# Patient Record
Sex: FEMALE | Race: WHITE | Employment: UNEMPLOYED | URBAN - METROPOLITAN AREA
[De-identification: names, ages, dates, MRNs, and addresses within clinical notes are randomized per-mention and may not be internally consistent; named-entity substitution may affect disease eponyms.]

---

## 2020-12-12 ENCOUNTER — HOSPITAL ENCOUNTER (EMERGENCY)
Facility: CLINIC | Age: 60
Discharge: ANOTHER HOSPITAL | End: 2020-12-12
Attending: PHYSICIAN ASSISTANT | Admitting: PHYSICIAN ASSISTANT
Payer: MEDICARE

## 2020-12-12 ENCOUNTER — APPOINTMENT (OUTPATIENT)
Dept: CT IMAGING | Facility: CLINIC | Age: 60
End: 2020-12-12
Attending: PHYSICIAN ASSISTANT
Payer: MEDICARE

## 2020-12-12 ENCOUNTER — TELEPHONE (OUTPATIENT)
Facility: CLINIC | Age: 60
End: 2020-12-12

## 2020-12-12 ENCOUNTER — HOSPITAL ENCOUNTER (INPATIENT)
Facility: CLINIC | Age: 60
LOS: 2 days | Discharge: HOME | DRG: 178 | End: 2020-12-14
Attending: INTERNAL MEDICINE | Admitting: INTERNAL MEDICINE
Payer: MEDICARE

## 2020-12-12 VITALS
RESPIRATION RATE: 20 BRPM | HEART RATE: 57 BPM | WEIGHT: 198 LBS | SYSTOLIC BLOOD PRESSURE: 115 MMHG | DIASTOLIC BLOOD PRESSURE: 94 MMHG | OXYGEN SATURATION: 95 % | TEMPERATURE: 98.8 F

## 2020-12-12 DIAGNOSIS — J96.01 ACUTE RESPIRATORY FAILURE WITH HYPOXIA (H): ICD-10-CM

## 2020-12-12 DIAGNOSIS — J44.9 COPD (CHRONIC OBSTRUCTIVE PULMONARY DISEASE) (H): ICD-10-CM

## 2020-12-12 DIAGNOSIS — Z20.822 SUSPECTED COVID-19 VIRUS INFECTION: ICD-10-CM

## 2020-12-12 DIAGNOSIS — R50.9 FEBRILE ILLNESS: ICD-10-CM

## 2020-12-12 DIAGNOSIS — R05.9 COUGH: ICD-10-CM

## 2020-12-12 DIAGNOSIS — R06.02 SHORTNESS OF BREATH: ICD-10-CM

## 2020-12-12 DIAGNOSIS — U07.1 COVID-19: Primary | ICD-10-CM

## 2020-12-12 LAB
ALBUMIN SERPL-MCNC: 3.3 G/DL (ref 3.4–5)
ALP SERPL-CCNC: 121 U/L (ref 40–150)
ALT SERPL W P-5'-P-CCNC: 18 U/L (ref 0–50)
ANION GAP SERPL CALCULATED.3IONS-SCNC: 3 MMOL/L (ref 3–14)
AST SERPL W P-5'-P-CCNC: 19 U/L (ref 0–45)
BASE EXCESS BLDV CALC-SCNC: 3.9 MMOL/L
BASOPHILS # BLD AUTO: 0 10E9/L (ref 0–0.2)
BASOPHILS NFR BLD AUTO: 0.5 %
BILIRUB SERPL-MCNC: 0.3 MG/DL (ref 0.2–1.3)
BUN SERPL-MCNC: 12 MG/DL (ref 7–30)
CALCIUM SERPL-MCNC: 8.4 MG/DL (ref 8.5–10.1)
CHLORIDE SERPL-SCNC: 107 MMOL/L (ref 94–109)
CO2 SERPL-SCNC: 29 MMOL/L (ref 20–32)
CREAT SERPL-MCNC: 0.82 MG/DL (ref 0.52–1.04)
CRP SERPL-MCNC: 4.8 MG/L (ref 0–8)
D DIMER PPP FEU-MCNC: 0.8 UG/ML FEU (ref 0–0.5)
DIFFERENTIAL METHOD BLD: ABNORMAL
DIFFERENTIAL METHOD BLD: NORMAL
EOSINOPHIL # BLD AUTO: 0 10E9/L (ref 0–0.7)
EOSINOPHIL NFR BLD AUTO: 1 %
ERYTHROCYTE [DISTWIDTH] IN BLOOD BY AUTOMATED COUNT: 15.1 % (ref 10–15)
ERYTHROCYTE [DISTWIDTH] IN BLOOD BY AUTOMATED COUNT: NORMAL % (ref 10–15)
FERRITIN SERPL-MCNC: 26 NG/ML (ref 8–252)
GFR SERPL CREATININE-BSD FRML MDRD: 78 ML/MIN/{1.73_M2}
GLUCOSE BLDC GLUCOMTR-MCNC: 140 MG/DL (ref 70–99)
GLUCOSE SERPL-MCNC: 80 MG/DL (ref 70–99)
HCO3 BLDV-SCNC: 31 MMOL/L (ref 21–28)
HCT VFR BLD AUTO: 42.6 % (ref 35–47)
HCT VFR BLD AUTO: NORMAL % (ref 35–47)
HGB BLD-MCNC: 12.9 G/DL (ref 11.7–15.7)
HGB BLD-MCNC: NORMAL G/DL (ref 11.7–15.7)
IMM GRANULOCYTES # BLD: 0 10E9/L (ref 0–0.4)
IMM GRANULOCYTES NFR BLD: 0.5 %
LYMPHOCYTES # BLD AUTO: 1.8 10E9/L (ref 0.8–5.3)
LYMPHOCYTES NFR BLD AUTO: 45.3 %
MAGNESIUM SERPL-MCNC: 1.9 MG/DL (ref 1.6–2.3)
MCH RBC QN AUTO: 27.3 PG (ref 26.5–33)
MCH RBC QN AUTO: NORMAL PG (ref 26.5–33)
MCHC RBC AUTO-ENTMCNC: 30.3 G/DL (ref 31.5–36.5)
MCHC RBC AUTO-ENTMCNC: NORMAL G/DL (ref 31.5–36.5)
MCV RBC AUTO: 90 FL (ref 78–100)
MCV RBC AUTO: NORMAL FL (ref 78–100)
MONOCYTES # BLD AUTO: 0.4 10E9/L (ref 0–1.3)
MONOCYTES NFR BLD AUTO: 10.4 %
NEUTROPHILS # BLD AUTO: 1.7 10E9/L (ref 1.6–8.3)
NEUTROPHILS NFR BLD AUTO: 42.3 %
NRBC # BLD AUTO: 0 10*3/UL
NRBC BLD AUTO-RTO: 0 /100
NT-PROBNP SERPL-MCNC: 39 PG/ML (ref 0–900)
PCO2 BLDV: 55 MM HG (ref 40–50)
PH BLDV: 7.36 PH (ref 7.32–7.43)
PLATELET # BLD AUTO: 182 10E9/L (ref 150–450)
PLATELET # BLD AUTO: NORMAL 10E9/L (ref 150–450)
PO2 BLDV: 22 MM HG (ref 25–47)
POTASSIUM SERPL-SCNC: 5 MMOL/L (ref 3.4–5.3)
PROT SERPL-MCNC: 7.1 G/DL (ref 6.8–8.8)
RBC # BLD AUTO: 4.72 10E12/L (ref 3.8–5.2)
RBC # BLD AUTO: NORMAL 10E12/L (ref 3.8–5.2)
SARS-COV-2 RNA SPEC QL NAA+PROBE: NORMAL
SODIUM SERPL-SCNC: 139 MMOL/L (ref 133–144)
SPECIMEN SOURCE: NORMAL
TROPONIN I SERPL-MCNC: <0.015 UG/L (ref 0–0.04)
WBC # BLD AUTO: 4 10E9/L (ref 4–11)
WBC # BLD AUTO: NORMAL 10E9/L (ref 4–11)

## 2020-12-12 PROCEDURE — 250N000011 HC RX IP 250 OP 636: Performed by: PHYSICIAN ASSISTANT

## 2020-12-12 PROCEDURE — 85025 COMPLETE CBC W/AUTO DIFF WBC: CPT | Performed by: PHYSICIAN ASSISTANT

## 2020-12-12 PROCEDURE — 85379 FIBRIN DEGRADATION QUANT: CPT | Performed by: PHYSICIAN ASSISTANT

## 2020-12-12 PROCEDURE — 250N000009 HC RX 250: Performed by: PHYSICIAN ASSISTANT

## 2020-12-12 PROCEDURE — 99285 EMERGENCY DEPT VISIT HI MDM: CPT | Mod: 25 | Performed by: EMERGENCY MEDICINE

## 2020-12-12 PROCEDURE — 86140 C-REACTIVE PROTEIN: CPT | Performed by: PHYSICIAN ASSISTANT

## 2020-12-12 PROCEDURE — 93010 ELECTROCARDIOGRAM REPORT: CPT | Performed by: EMERGENCY MEDICINE

## 2020-12-12 PROCEDURE — 96375 TX/PRO/DX INJ NEW DRUG ADDON: CPT | Performed by: EMERGENCY MEDICINE

## 2020-12-12 PROCEDURE — 83735 ASSAY OF MAGNESIUM: CPT | Performed by: PHYSICIAN ASSISTANT

## 2020-12-12 PROCEDURE — 83880 ASSAY OF NATRIURETIC PEPTIDE: CPT | Performed by: PHYSICIAN ASSISTANT

## 2020-12-12 PROCEDURE — 99222 1ST HOSP IP/OBS MODERATE 55: CPT | Mod: AI | Performed by: INTERNAL MEDICINE

## 2020-12-12 PROCEDURE — 71275 CT ANGIOGRAPHY CHEST: CPT

## 2020-12-12 PROCEDURE — C9803 HOPD COVID-19 SPEC COLLECT: HCPCS | Performed by: EMERGENCY MEDICINE

## 2020-12-12 PROCEDURE — 94640 AIRWAY INHALATION TREATMENT: CPT | Performed by: EMERGENCY MEDICINE

## 2020-12-12 PROCEDURE — 96374 THER/PROPH/DIAG INJ IV PUSH: CPT | Mod: 59 | Performed by: EMERGENCY MEDICINE

## 2020-12-12 PROCEDURE — U0003 INFECTIOUS AGENT DETECTION BY NUCLEIC ACID (DNA OR RNA); SEVERE ACUTE RESPIRATORY SYNDROME CORONAVIRUS 2 (SARS-COV-2) (CORONAVIRUS DISEASE [COVID-19]), AMPLIFIED PROBE TECHNIQUE, MAKING USE OF HIGH THROUGHPUT TECHNOLOGIES AS DESCRIBED BY CMS-2020-01-R: HCPCS | Performed by: PHYSICIAN ASSISTANT

## 2020-12-12 PROCEDURE — 84484 ASSAY OF TROPONIN QUANT: CPT | Performed by: PHYSICIAN ASSISTANT

## 2020-12-12 PROCEDURE — 82728 ASSAY OF FERRITIN: CPT | Performed by: PHYSICIAN ASSISTANT

## 2020-12-12 PROCEDURE — 82803 BLOOD GASES ANY COMBINATION: CPT | Performed by: PHYSICIAN ASSISTANT

## 2020-12-12 PROCEDURE — 80053 COMPREHEN METABOLIC PANEL: CPT | Performed by: PHYSICIAN ASSISTANT

## 2020-12-12 PROCEDURE — 93005 ELECTROCARDIOGRAM TRACING: CPT | Performed by: EMERGENCY MEDICINE

## 2020-12-12 PROCEDURE — 250N000013 HC RX MED GY IP 250 OP 250 PS 637: Performed by: PHYSICIAN ASSISTANT

## 2020-12-12 PROCEDURE — 999N001017 HC STATISTIC GLUCOSE BY METER IP

## 2020-12-12 PROCEDURE — 120N000001 HC R&B MED SURG/OB

## 2020-12-12 RX ORDER — ACETAMINOPHEN 500 MG
1000 TABLET ORAL ONCE
Status: COMPLETED | OUTPATIENT
Start: 2020-12-12 | End: 2020-12-12

## 2020-12-12 RX ORDER — ONDANSETRON 2 MG/ML
4 INJECTION INTRAMUSCULAR; INTRAVENOUS EVERY 6 HOURS PRN
Status: DISCONTINUED | OUTPATIENT
Start: 2020-12-12 | End: 2020-12-14 | Stop reason: HOSPADM

## 2020-12-12 RX ORDER — ONDANSETRON 4 MG/1
4 TABLET, ORALLY DISINTEGRATING ORAL EVERY 6 HOURS PRN
Status: DISCONTINUED | OUTPATIENT
Start: 2020-12-12 | End: 2020-12-14 | Stop reason: HOSPADM

## 2020-12-12 RX ORDER — IOPAMIDOL 755 MG/ML
83 INJECTION, SOLUTION INTRAVASCULAR ONCE
Status: COMPLETED | OUTPATIENT
Start: 2020-12-12 | End: 2020-12-12

## 2020-12-12 RX ORDER — PROCHLORPERAZINE 25 MG
25 SUPPOSITORY, RECTAL RECTAL EVERY 12 HOURS PRN
Status: DISCONTINUED | OUTPATIENT
Start: 2020-12-12 | End: 2020-12-14 | Stop reason: HOSPADM

## 2020-12-12 RX ORDER — ALBUTEROL SULFATE 90 UG/1
6 AEROSOL, METERED RESPIRATORY (INHALATION) EVERY 6 HOURS PRN
Status: DISCONTINUED | OUTPATIENT
Start: 2020-12-12 | End: 2020-12-12 | Stop reason: HOSPADM

## 2020-12-12 RX ORDER — ACETAMINOPHEN 325 MG/1
650 TABLET ORAL EVERY 4 HOURS PRN
Status: DISCONTINUED | OUTPATIENT
Start: 2020-12-12 | End: 2020-12-14 | Stop reason: HOSPADM

## 2020-12-12 RX ORDER — DEXAMETHASONE SODIUM PHOSPHATE 4 MG/ML
6 INJECTION, SOLUTION INTRA-ARTICULAR; INTRALESIONAL; INTRAMUSCULAR; INTRAVENOUS; SOFT TISSUE ONCE
Status: COMPLETED | OUTPATIENT
Start: 2020-12-12 | End: 2020-12-12

## 2020-12-12 RX ORDER — ONDANSETRON 2 MG/ML
4 INJECTION INTRAMUSCULAR; INTRAVENOUS EVERY 30 MIN PRN
Status: DISCONTINUED | OUTPATIENT
Start: 2020-12-12 | End: 2020-12-12 | Stop reason: HOSPADM

## 2020-12-12 RX ORDER — SODIUM CHLORIDE 9 MG/ML
INJECTION, SOLUTION INTRAVENOUS CONTINUOUS
Status: DISCONTINUED | OUTPATIENT
Start: 2020-12-13 | End: 2020-12-13

## 2020-12-12 RX ORDER — KETOROLAC TROMETHAMINE 15 MG/ML
15 INJECTION, SOLUTION INTRAMUSCULAR; INTRAVENOUS ONCE
Status: COMPLETED | OUTPATIENT
Start: 2020-12-12 | End: 2020-12-12

## 2020-12-12 RX ORDER — PROCHLORPERAZINE MALEATE 5 MG
10 TABLET ORAL EVERY 6 HOURS PRN
Status: DISCONTINUED | OUTPATIENT
Start: 2020-12-12 | End: 2020-12-14 | Stop reason: HOSPADM

## 2020-12-12 RX ORDER — LIDOCAINE 40 MG/G
CREAM TOPICAL
Status: DISCONTINUED | OUTPATIENT
Start: 2020-12-12 | End: 2020-12-14 | Stop reason: HOSPADM

## 2020-12-12 RX ADMIN — IOPAMIDOL 83 ML: 755 INJECTION, SOLUTION INTRAVENOUS at 18:04

## 2020-12-12 RX ADMIN — KETOROLAC TROMETHAMINE 15 MG: 15 INJECTION, SOLUTION INTRAMUSCULAR; INTRAVENOUS at 21:16

## 2020-12-12 RX ADMIN — ONDANSETRON 4 MG: 2 INJECTION INTRAMUSCULAR; INTRAVENOUS at 21:18

## 2020-12-12 RX ADMIN — DEXAMETHASONE SODIUM PHOSPHATE 6 MG: 4 INJECTION, SOLUTION INTRAMUSCULAR; INTRAVENOUS at 20:58

## 2020-12-12 RX ADMIN — ACETAMINOPHEN 1000 MG: 500 TABLET, FILM COATED ORAL at 16:35

## 2020-12-12 RX ADMIN — ALBUTEROL SULFATE 6 PUFF: 90 AEROSOL, METERED RESPIRATORY (INHALATION) at 20:47

## 2020-12-12 RX ADMIN — SODIUM CHLORIDE 100 ML: 9 INJECTION, SOLUTION INTRAVENOUS at 18:04

## 2020-12-12 RX ADMIN — ALBUTEROL SULFATE 6 PUFF: 90 AEROSOL, METERED RESPIRATORY (INHALATION) at 19:24

## 2020-12-12 ASSESSMENT — ENCOUNTER SYMPTOMS
CARDIOVASCULAR NEGATIVE: 1
VOMITING: 1
FATIGUE: 1
CHEST TIGHTNESS: 1
COUGH: 1
ARTHRALGIAS: 1
DIARRHEA: 1
FEVER: 1
HEADACHES: 1
SHORTNESS OF BREATH: 1
ABDOMINAL PAIN: 0
WHEEZING: 1
NAUSEA: 1

## 2020-12-12 NOTE — ED PROVIDER NOTES
"  History     Chief Complaint   Patient presents with     Dental Pain     Shortness of Breath     which is her normal due to COPD     Fever     HPI  Radha Fowler is a 60 year old female with history of COPD (not on home oxygen), hypertension, type 2 diabetes mellitus, hypothyroidism, GERD, CAD s/p CABG x3, past methamphetamine abuse who presents with complaints of shortness of breath, fevers up to 101 F, body aches, productive cough, fatigue, and headache for the past 12 days.  Patient reports increased yellow sputum production from baseline.  She does have history of COPD and has been experiencing more wheezing and chest tightness.  She uses her inhaler with minimal improvement.  Patient states her boyfriend is ill with similar symptoms and she is pretty sure he has COVID-19 as he cannot taste or smell.  Patient had dental extractions completed on 12/5/2020.  She states she was feeling ill before her teeth were extracted.  Patient also complains of associated nausea, vomiting, and diarrhea.      Previous Records in Care Everywhere were Reviewed:  Allergies  Reconcile with Patient's Chart  Active Allergy Reactions Severity Noted Date Comments   Cats (Fur, Dander, Saliva) *Unknown   07/25/2018     Dog Dander *Unknown   07/25/2018       Medications  Reconcile with Patient's Chart  Medication Sig Dispensed Refills Start Date End Date Status   aspirin (ECOTRIN) 81 mg enteric coated tablet    Indications: HTN (hypertension) Take 1 tablet by mouth once daily with a meal.   0 02/13/2017   Active   venlafaxine (EFFEXOR XR) 150 mg Extended-Release capsule   Take 300 mg by mouth once daily with a meal.   0     Active   albuterol HFA (VENTOLIN HFA) 90 mcg/actuation inhaler    Indications: Centrilobular emphysema (HC) Inhale 2 Puffs by mouth 4 times daily if needed (\"rescue inhaler\"). 1 Inhaler   2 10/22/2019   Active   omeprazole (PRILOSEC) 40 mg Delayed-Release capsule    Indications: Gastroesophageal reflux disease, " esophagitis presence not specified TAKE 1 CAPSULE BY MOUTH ONCE DAILY BEFORE A MEAL 90 capsule   2 01/28/2020   Active   blood sugar diagnostic (ACCU-CHEK GUIDE) strip    Indications: Type 2 diabetes mellitus with other circulatory complication, without long-term current use of insulin (HC) Accu Chek guide strips E11.9 NIDDM type II - Test 1 time/day 100 Each   5 02/05/2020   Active   lancets    Indications: Type 2 diabetes mellitus with other circulatory complication, without long-term current use of insulin (HC) Accu Chek fast clix. E11.9 NIDDM type II - Test 1 time/day 100 Each   5 02/05/2020   Active   nitroglycerin (NITROSTAT) 0.4 mg sublingual tablet    Indications: S/P CABG x 3 Place 1 tablet under the tongue every 5 minutes if needed. 25 tablet   0 02/27/2020   Active   loratadine (CLARITIN) 10 mg tablet    Indications: Seasonal allergic rhinitis due to pollen Take 1 tablet by mouth once daily. For allergy symptoms 90 tablet   1 05/05/2020   Active   acetaminophen (TYLENOL EXTRA STRGTH) 500 mg tablet    Indications: S/P CABG x 3 Take 2 tablets by mouth every 6 hours if needed. Max acetaminophen dose: 4000mg in 24 hrs.   0 06/30/2020   Active   fluticasone (50 mcg per actuation) nasal solution (FLONASE)    Indications: Viral URI, Sinus pressure Inhale 1 Spray into both nostrils once daily if needed for Rhinitis. 1 Bottle   5 06/30/2020   Active   albuterol-ipratropium (DUONEB) (2.5-0.5 mg) in 3 mL NEBULIZATION solution    Indications: Chronic obstructive pulmonary disease with acute exacerbation (HC) Inhale 3 mL via a nebulizer every 6 hours if needed. 1 box   0 09/23/2020   Active   metoprolol succinate (TOPROL XL) 50 mg sustained-release tablet    Indications: S/P CABG x 3, Essential hypertension Take 1 tablet by mouth once daily. 90 tablet   1 09/23/2020   Active   levothyroxine (SYNTHROID) 100 mcg tablet    Indications: Hypothyroidism (acquired) TAKE 1 TABLET BY MOUTH ONCE DAILY BEFORE BREAKFAST FOR LOW  THYROID. BEST TAKEN ON AN EMPTY STOMACH. (RECHECK LABS IN 10 WEEKS) 90 tablet   1 09/23/2020   Active   lisinopriL (PRINIVIL; ZESTRIL) 2.5 mg tablet    Indications: Essential hypertension Take 1 tablet by mouth once daily. 90 tablet   3 09/23/2020   Active   metFORMIN (GLUCOPHAGE) 500 mg tablet    Indications: Type 2 diabetes mellitus with diabetic polyneuropathy, unspecified whether long term insulin use (HC) Take 1 tablet by mouth 2 times daily with meals. 180 tablet   1 09/23/2020   Active   gabapentin (NEURONTIN) 300 mg capsule    Indications: Neuralgia Take 4 capsules by mouth 2 times daily. 240 capsule   3 09/23/2020   Active   QUEtiapine (SEROQUEL) 25 mg tablet    Indications: Primary insomnia Take 1 tablet by mouth at bedtime if needed. 90 tablet   1 09/23/2020   Active   cyclobenzaprine (FLEXERIL) 10 mg tablet    Indications: Strain of neck muscle, initial encounter Take 1 tablet by mouth 2 times daily if needed for Muscle Spasm. 60 tablet   1 09/23/2020   Active   atorvastatin (LIPITOR) 40 mg tablet    Indications: S/P CABG x 3 TAKE 1 TABLET BY MOUTH AT BEDTIME 90 tablet   0 10/28/2020   Active   tiotropium (SPIRIVA) 18 mcg inhalation capsule    Indications: Chronic obstructive pulmonary disease, unspecified COPD type (HC) Inhale 18 mcg by mouth once daily. To decrease wheezing. 90 capsule   3 10/26/2020   Active   lidocaine 4% (SALONPAS, LIDOCAINE,) 4 % patch    Indications: Cervical radiculopathy Apply to intact skin for upto 12 hrs within 24-hr period. 30 Patch   5 10/26/2020   Active   Shower Chair    Indications: Balance problem, Mobility impaired For home use. 1 Device   0 10/26/2020   Active   nystatin powder (NYSTOP) powder    Indications: Candidal intertrigo APPLY 1 POWDER TOPICALLY TO AFFECTED AREA 4 TIMES DAILY 60 g   2 12/08/2020   Active   nystatin powder (MYCOSTATIN) powder    Indications: Candidal intertrigo Apply 1 Strip topically to affected area(s) 4 times daily. 1 Bottle   0 10/26/2020  12/06/2020 Discontinued   NYSTOP powder    Indications: Candidal intertrigo APPLY 1 POWDER TOPICALLY TO AFFECTED AREA 4 TIMES DAILY 30 g   0 12/06/2020 12/07/2020 Discontinued (Reorder (E-cancel not sent))     Active Problems  Reconcile with Patient's Chart  Problem Noted Date   Essential hypertension 10/04/2020   Fall 10/06/2018   Anemia associated with acute blood loss 08/04/2018   Hyperglycemia 08/04/2018   Coronary artery disease 08/02/2018   S/P CABG x 3 08/02/2018   Overview:     8/2/18 s/p Coronary bypass x3, left internal mammary artery to LAD, saphenous vein to intermediate marginal, saphenous vein to a distal right coronary artery.     Abnormal cardiovascular stress test--inferior ischemia 07/25/2018   Angina pectoris 07/25/2018   Chronic right shoulder pain 07/25/2018   Bell's palsy 09/28/2016   Type 2 diabetes mellitus with diabetic polyneuropathy 04/06/2016   Hypothyroidism (acquired) 04/06/2016   Excessive daytime sleepiness 07/20/2015   Vitamin D deficiency 06/12/2015   COPD (chronic obstructive pulmonary disease) 05/09/2013   Obesity, unspecified 04/08/2011   Hyperlipidemia LDL goal < 100 01/10/2011   Vitamin D deficiency 09/22/2010   Menopause 05/26/2010   Cigarette smoker 05/06/2010   HTN (hypertension) 02/18/2010   Depression with anxiety 12/10/2009   Esophageal reflux 10/06/2009   Methamphetamine abuse 06/02/2006   Delirium due to multiple etiologies, acute, hyperactive     Methamphetamine use disorder, severe, dependence       Resolved Problems    Problem Noted Date Resolved Date   Nicotine dependence with withdrawal 11/18/2016 08/05/2018   Neuralgia 06/20/2016 08/05/2018   Acquired hypothyroidism 10/21/2015 05/10/2016   Bipolar 2 disorder 10/06/2009 12/10/2009   Myalgia and myositis, unspecified 10/06/2009 05/10/2016   Unspecified hypothyroidism 10/06/2009 10/21/2015   Unspecified asthma(493.90) 10/06/2009 05/09/2013   Chest pain in adult   08/01/2018   Surgical History    Surgery Date  Site/Laterality Comments   BREAST LUMPECTOMY 2006 - 2006   [facility], cannot recall what side     COLONOSCOPY DIAGNOSTIC     Q5yr plan, due 2016     CORONARY ARTERY BYPASS GRAFT 08/01/2018   x3       Medical History    Medical History Date Comments   Unspecified hypothyroidism       Unspecified essential hypertension       Bipolar disorder, unspecified (HC)       Unspecified asthma(493.90)       Esophageal reflux       Allergic rhinitis, cause unspecified       Methamphetamine use (HC)   quit in    Inversion, nipple   and drainage   CAD (coronary artery disease)       COPD (chronic obstructive pulmonary disease) (HC)       Diabetes mellitus (HC)       Anxiety       Asthma       Tobacco abuse disorder         Family History    Medical History Relation Name Comments   Stroke Father       Cancer-breast Maternal Aunt   40's   Cancer-breast Maternal Aunt   40's   Hyperlipidemia Mother       Hypertension Mother       Other Mother   breast drainage, surgery not cancer   Rheum arthritis Mother       Thyroid Disease Mother       Valvular heart disease Mother         Relation Name Status Comments   Father        Maternal Aunt   Alive     Maternal Aunt   Alive     Mother   Alive       Social History    Tobacco Use Types Packs/Day Years Used Date   Current Every Day Smoker Cigarettes 0.25 30     Smokeless Tobacco: Never Used           Tobacco Cessation: Ready to Quit: No; Counseling Given: Yes  Comments: 6 cigarettes a day     Alcohol Use Drinks/Week oz/Week Comments   No 0 Standard drinks or equivalent   0.0            Allergies:  No Known Allergies    Problem List:    There are no active problems to display for this patient.       Past Medical History:    No past medical history on file.    Past Surgical History:    No past surgical history on file.    Family History:    No family history on file.    Social History:  Marital Status:  Single [1]  Social History     Tobacco Use     Smoking status:  Not on file   Substance Use Topics     Alcohol use: Not on file     Drug use: Not on file        Medications:    No current outpatient medications on file.        Review of Systems   Constitutional: Positive for fatigue and fever.   Respiratory: Positive for cough, chest tightness, shortness of breath and wheezing.    Cardiovascular: Negative.  Negative for chest pain.   Gastrointestinal: Positive for diarrhea, nausea and vomiting. Negative for abdominal pain.   Genitourinary: Negative.    Musculoskeletal: Positive for arthralgias.   Skin: Negative.    Neurological: Positive for headaches.   All other systems reviewed and are negative.      Physical Exam   BP: 124/54  Pulse: 65  Temp: 97.8  F (36.6  C)  Resp: 16  Weight: 89.8 kg (198 lb)  SpO2: 99 %      Physical Exam  Vitals signs and nursing note reviewed.   Constitutional:       General: She is in acute distress.      Appearance: She is well-developed. She is not ill-appearing, toxic-appearing or diaphoretic.   HENT:      Head: Normocephalic and atraumatic.      Nose: Nose normal. No congestion or rhinorrhea.      Mouth/Throat:      Lips: Pink.      Mouth: Mucous membranes are moist.      Pharynx: Oropharynx is clear. Uvula midline. No pharyngeal swelling, oropharyngeal exudate, posterior oropharyngeal erythema or uvula swelling.      Tonsils: No tonsillar exudate or tonsillar abscesses.      Comments: Several tooth extraction sites to left upper and lower jawline.  No purulent drainage.  No fluctuance or swelling.    Eyes:      Conjunctiva/sclera: Conjunctivae normal.      Pupils: Pupils are equal, round, and reactive to light.   Neck:      Musculoskeletal: Normal range of motion and neck supple. No neck rigidity.   Cardiovascular:      Rate and Rhythm: Normal rate and regular rhythm.      Heart sounds: Normal heart sounds.   Pulmonary:      Effort: Pulmonary effort is normal. Tachypnea present. No respiratory distress.      Breath sounds: No stridor, decreased  air movement or transmitted upper airway sounds. Wheezing (diffuse expiratory wheezing throughout lung fields) present. No decreased breath sounds, rhonchi or rales.   Abdominal:      General: There is no distension.      Palpations: Abdomen is soft.      Tenderness: There is no abdominal tenderness. There is no guarding or rebound.   Musculoskeletal: Normal range of motion.         General: No swelling or tenderness.      Right lower leg: No edema.      Left lower leg: No edema.   Lymphadenopathy:      Cervical: No cervical adenopathy.   Skin:     General: Skin is warm and dry.   Neurological:      General: No focal deficit present.      Mental Status: She is alert and oriented to person, place, and time.   Psychiatric:         Mood and Affect: Mood normal.         Behavior: Behavior normal.         ED Course        Procedures               EKG Interpretation:      Interpreted by Patrica Sánchez PA-C  Symptoms at time of EKG: Shortness of breath   Rhythm: Normal sinus   Rate: Bradycardia  Axis: Normal  Ectopy: None  Conduction: Normal  ST Segments/ T Waves: Non-specific ST-T wave changes and T wave inversion V1 and V2  Q Waves: None  Comparison to prior: No old EKG available    Clinical Impression: Non-specific EKG      Results for orders placed or performed during the hospital encounter of 12/12/20 (from the past 24 hour(s))   CBC with platelets, differential   Result Value Ref Range    WBC Canceled, Test credited 4.0 - 11.0 10e9/L    RBC Count Canceled, Test credited 3.8 - 5.2 10e12/L    Hemoglobin Canceled, Test credited 11.7 - 15.7 g/dL    Hematocrit Canceled, Test credited 35.0 - 47.0 %    MCV Canceled, Test credited 78 - 100 fl    MCH Canceled, Test credited 26.5 - 33.0 pg    MCHC Canceled, Test credited 31.5 - 36.5 g/dL    RDW Canceled, Test credited 10.0 - 15.0 %    Platelet Count Canceled, Test credited 150 - 450 10e9/L    Diff Method Canceled, Test credited    Comprehensive metabolic panel   Result Value  Ref Range    Sodium 139 133 - 144 mmol/L    Potassium 5.0 3.4 - 5.3 mmol/L    Chloride 107 94 - 109 mmol/L    Carbon Dioxide 29 20 - 32 mmol/L    Anion Gap 3 3 - 14 mmol/L    Glucose 80 70 - 99 mg/dL    Urea Nitrogen 12 7 - 30 mg/dL    Creatinine 0.82 0.52 - 1.04 mg/dL    GFR Estimate 78 >60 mL/min/[1.73_m2]    GFR Estimate If Black >90 >60 mL/min/[1.73_m2]    Calcium 8.4 (L) 8.5 - 10.1 mg/dL    Bilirubin Total 0.3 0.2 - 1.3 mg/dL    Albumin 3.3 (L) 3.4 - 5.0 g/dL    Protein Total 7.1 6.8 - 8.8 g/dL    Alkaline Phosphatase 121 40 - 150 U/L    ALT 18 0 - 50 U/L    AST 19 0 - 45 U/L   Troponin I   Result Value Ref Range    Troponin I ES <0.015 0.000 - 0.045 ug/L   NT pro BNP   Result Value Ref Range    N-Terminal Pro BNP Inpatient 39 0 - 900 pg/mL   D dimer quantitative   Result Value Ref Range    D Dimer 0.8 (H) 0.0 - 0.50 ug/ml FEU   Symptomatic COVID-19 Virus (Coronavirus) by PCR    Specimen: Nasopharyngeal   Result Value Ref Range    COVID-19 Virus PCR to [facility] - Source Nasopharyngeal     COVID-19 Virus PCR to [facility] - Result       Test received-See reflex to IDDL test SARS CoV2 (COVID-19) Virus RT-PCR   CBC with platelets differential   Result Value Ref Range    WBC 4.0 4.0 - 11.0 10e9/L    RBC Count 4.72 3.8 - 5.2 10e12/L    Hemoglobin 12.9 11.7 - 15.7 g/dL    Hematocrit 42.6 35.0 - 47.0 %    MCV 90 78 - 100 fl    MCH 27.3 26.5 - 33.0 pg    MCHC 30.3 (L) 31.5 - 36.5 g/dL    RDW 15.1 (H) 10.0 - 15.0 %    Platelet Count 182 150 - 450 10e9/L    Diff Method Automated Method     % Neutrophils 42.3 %    % Lymphocytes 45.3 %    % Monocytes 10.4 %    % Eosinophils 1.0 %    % Basophils 0.5 %    % Immature Granulocytes 0.5 %    Nucleated RBCs 0 0 /100    Absolute Neutrophil 1.7 1.6 - 8.3 10e9/L    Absolute Lymphocytes 1.8 0.8 - 5.3 10e9/L    Absolute Monocytes 0.4 0.0 - 1.3 10e9/L    Absolute Eosinophils 0.0 0.0 - 0.7 10e9/L    Absolute Basophils 0.0 0.0 - 0.2 10e9/L    Abs Immature Granulocytes 0.0 0 - 0.4 10e9/L     Absolute Nucleated RBC 0.0    Magnesium   Result Value Ref Range    Magnesium 1.9 1.6 - 2.3 mg/dL   CRP inflammation   Result Value Ref Range    CRP Inflammation 4.8 0.0 - 8.0 mg/L   Ferritin   Result Value Ref Range    Ferritin 26 8 - 252 ng/mL   CT Chest Pulmonary Embolism w Contrast    Narrative    EXAM: CT CHEST PULMONARY EMBOLISM W CONTRAST  LOCATION: [facility], [place]  DATE/TIME: 12/12/2020 5:27 PM    INDICATION: Shortness of breath with fevers and cough.  COMPARISON: None.  TECHNIQUE: CT chest pulmonary angiogram during arterial phase injection of IV contrast. Multiplanar reformats and MIP reconstructions were performed. Dose reduction techniques were used.   CONTRAST: 83 mL Isovue-370.    FINDINGS:  ANGIOGRAM CHEST: Pulmonary arteries are normal caliber and negative for pulmonary emboli. Thoracic aorta is negative for dissection. No CT evidence of right heart strain.    LUNGS AND PLEURA: A few faint minimal regions of ground-glass infiltrates within the periphery of the lower lobes and right middle lobe nonspecific.    MEDIASTINUM/AXILLAE: Sternotomy. There are a few borderline enlarged mediastinal nodes which are probably reactive. Small esophageal hiatal hernia.    UPPER ABDOMEN: Normal.    MUSCULOSKELETAL: Normal.      Impression    IMPRESSION:  1.  No pulmonary embolism.    2.  Previous sternotomy with postsurgical changes of CABG.    3.  A few very faint regions of minimal ground-glass infiltrates in the lower lobes nonspecific but compatible with inflammation.    4.  Borderline enlarged mediastinal lymph nodes likely reactive.   Blood gas venous   Result Value Ref Range    Ph Venous 7.36 7.32 - 7.43 pH    PCO2 Venous 55 (H) 40 - 50 mm Hg    PO2 Venous 22 (L) 25 - 47 mm Hg    Bicarbonate Venous 31 (H) 21 - 28 mmol/L    Base Excess Venous 3.9 mmol/L       Medications   albuterol (PROAIR HFA/PROVENTIL HFA/VENTOLIN HFA) 108 (90 Base) MCG/ACT inhaler 6 puff (6 puffs Inhalation Given 12/12/20 2047)    ondansetron (ZOFRAN) injection 4 mg (4 mg Intravenous Given 12/12/20 2118)   acetaminophen (TYLENOL) tablet 1,000 mg (1,000 mg Oral Given 12/12/20 1635)   iopamidol (ISOVUE-370) solution 83 mL (83 mLs Intravenous Given 12/12/20 1804)   sodium chloride 0.9 % bag 500mL for CT scan flush use (100 mLs Intravenous Given 12/12/20 1804)   dexamethasone (DECADRON) injection 6 mg (6 mg Intravenous Given 12/12/20 2058)   ketorolac (TORADOL) injection 15 mg (15 mg Intravenous Given 12/12/20 2116)       Assessments & Plan (with Medical Decision Making)   60 year old female with history of COPD (not on home oxygen), hypertension, type 2 diabetes mellitus, hypothyroidism, GERD, CAD s/p CABG x3, past methamphetamine abuse who presents with complaints of shortness of breath, fevers up to 101 F, body aches, productive cough, fatigue, and headache for the past 12 days.  Patient reports increased yellow sputum production from baseline.  She does have history of COPD and has been experiencing more wheezing and chest tightness.  Her boyfriend is ill with similar symptoms and she is pretty sure he has COVID-19 as he cannot taste or smell.  Patient had dental extractions completed on 12/5/2020.  She states she was feeling ill before her teeth were extracted.  Patient also complains of associated nausea, vomiting, and diarrhea.       Pt is afebrile on arrival.  Exam as above.  No ischemic changes on EKG.  Troponin is undetectable.  proBNP is not elevated.  White blood cell count is normal.  Comprehensive metabolic panel is unremarkable.  CT scan of the chest was negative for PE.  There are a few very faint regions of minimal groundglass infiltrates in the lower lungs which are nonspecific.  Borderline enlarged mediastinal lymph nodes also noted which are likely reactive.  COVID-19 testing was obtained and is currently pending.  Patient was given albuterol inhaler here with improvement in her wheezing.  Patient continues to complain of  feeling lightheaded and is noted to become hypoxic at 83% with any sort of movement such as sitting up in bed.  Patient was placed on 2 L of O2 with improvement and her oxygen saturations are in the mid 90s.  Patient was given dose of 6 mg IV Dexamethasone.  Discussed results with patient.  Given patient's hypoxia and concern for COVID-19 infection, will plan on admitting patient to the hospital for further evaluation and management.  Patient is in agreement with this plan.  We unfortunately do not have any inpatient bed availability here at [facility].  Attempted to contact multiple hospitals including [facility] and [facility] without any bed availability.  [facility] does have bed availability and therefore I discussed patient's case with on-call hospitalist, Marissa Baltazar PA-C, who accepts the transfer and care of the patient.  Considered COPD exacerbation contributing to patient's symptoms however did not start antibiotics until COVID-19 results are known especially given her findings on CT and possible exposure at home to COVID-19.  I did add on D-dimer and VBG as well as urinalysis per hospitalist request.  Patient was transferred in stable condition.    Discussed pt's case with the ED physician on staff (Dr. Cole).  He was involved in the care of the patient throughout her ED visit including assessment, diagnosis, treatment, and plan of the patient.    I have reviewed the nursing notes.    I have reviewed the findings, diagnosis, plan with the patient.     ED to Inpatient Handoff:    Discussed with Hospitalist, Marissa Baltazar PA-C at [facility]  Patient accepted for Inpatient Stay  Pending studies include VBG, D-dimer  Code Status: Full Code           New Prescriptions    No medications on file       Final diagnoses:   Acute respiratory failure with hypoxia (H)   Cough   Shortness of breath   Febrile illness   Suspected COVID-19 virus infection   COPD (chronic obstructive pulmonary disease) (H)        12/12/2020   [facility] EMERGENCY DEPT      Disclaimer:  This note consists of symbols derived from keyboarding, dictation and/or voice recognition software.  As a result, there may be errors in the script that have gone undetected.  Please consider this when interpreting information found in this chart.     Patrica Sánchez PA-C  12/12/20 3170    Physician Attestation   I, Abdirashid Cole MD, saw and evaluated the patient as part of a shared visit.  I have reviewed and discussed with the advanced practice provider their history, physical and plan.    I discussed/reviewed the patient's evaluation, medical decision making and treatment plan.   I personally reviewed the vital signs, medications, labs and imaging.    My key history or physical exam findings: 60 year old female with history of COPD, HTN, type 2 diabetes mellitus, CAD s/p CABG x3, past methamphetamine abuse shortness of breath, fevers up to 101 F, for the past 12 days. Exposed to Covid-19.  She has acute respiratory failure with hypoxia, labored breathing. Hypoxic at 83% with any sort of movement such as sitting up in bed and was placed on 2 L of O2 with improvement and her oxygen saturations are in the mid 90s.  Patient was given dose of 6 mg IV Dexamethasone    Key management decisions made by me: Dexamethasone, CT of chest PE protocol showed no PE, but ground glass opacities c/w Covid-19 pneumonia. Admit.    I agree with the PA's evaluation, assessment and treatment plan.  Face to face time with the patient = 10 minutes.    Abdirashid Cole MD  Date of Service (when I saw the patient): 12/14/20                   Abdirashid Cole MD  12/14/20 1007

## 2020-12-12 NOTE — ED TRIAGE NOTES
Pt had dental work on Dec 4th and has been sick following this she has fever, fatigue and nausea.

## 2020-12-13 LAB
ALBUMIN SERPL-MCNC: 3 G/DL (ref 3.4–5)
ALP SERPL-CCNC: 116 U/L (ref 40–150)
ALT SERPL W P-5'-P-CCNC: 16 U/L (ref 0–50)
ANION GAP SERPL CALCULATED.3IONS-SCNC: 2 MMOL/L (ref 3–14)
AST SERPL W P-5'-P-CCNC: 14 U/L (ref 0–45)
BILIRUB DIRECT SERPL-MCNC: <0.1 MG/DL (ref 0–0.2)
BILIRUB SERPL-MCNC: 0.3 MG/DL (ref 0.2–1.3)
BUN SERPL-MCNC: 17 MG/DL (ref 7–30)
CALCIUM SERPL-MCNC: 8.7 MG/DL (ref 8.5–10.1)
CHLORIDE SERPL-SCNC: 107 MMOL/L (ref 94–109)
CO2 SERPL-SCNC: 30 MMOL/L (ref 20–32)
CREAT SERPL-MCNC: 0.88 MG/DL (ref 0.52–1.04)
ERYTHROCYTE [DISTWIDTH] IN BLOOD BY AUTOMATED COUNT: 15.2 % (ref 10–15)
GFR SERPL CREATININE-BSD FRML MDRD: 71 ML/MIN/{1.73_M2}
GLUCOSE BLDC GLUCOMTR-MCNC: 114 MG/DL (ref 70–99)
GLUCOSE BLDC GLUCOMTR-MCNC: 144 MG/DL (ref 70–99)
GLUCOSE BLDC GLUCOMTR-MCNC: 155 MG/DL (ref 70–99)
GLUCOSE BLDC GLUCOMTR-MCNC: 161 MG/DL (ref 70–99)
GLUCOSE SERPL-MCNC: 120 MG/DL (ref 70–99)
HCT VFR BLD AUTO: 44.5 % (ref 35–47)
HGB BLD-MCNC: 13.2 G/DL (ref 11.7–15.7)
LABORATORY COMMENT REPORT: ABNORMAL
MCH RBC QN AUTO: 27.7 PG (ref 26.5–33)
MCHC RBC AUTO-ENTMCNC: 29.7 G/DL (ref 31.5–36.5)
MCV RBC AUTO: 94 FL (ref 78–100)
PLATELET # BLD AUTO: 194 10E9/L (ref 150–450)
POTASSIUM SERPL-SCNC: 4.7 MMOL/L (ref 3.4–5.3)
PROCALCITONIN SERPL-MCNC: <0.05 NG/ML
PROT SERPL-MCNC: 7 G/DL (ref 6.8–8.8)
RBC # BLD AUTO: 4.76 10E12/L (ref 3.8–5.2)
SARS-COV-2 RNA SPEC QL NAA+PROBE: POSITIVE
SODIUM SERPL-SCNC: 139 MMOL/L (ref 133–144)
SPECIMEN SOURCE: ABNORMAL
WBC # BLD AUTO: 2.4 10E9/L (ref 4–11)

## 2020-12-13 PROCEDURE — 258N000003 HC RX IP 258 OP 636: Performed by: INTERNAL MEDICINE

## 2020-12-13 PROCEDURE — 85027 COMPLETE CBC AUTOMATED: CPT | Performed by: INTERNAL MEDICINE

## 2020-12-13 PROCEDURE — 250N000012 HC RX MED GY IP 250 OP 636 PS 637: Performed by: INTERNAL MEDICINE

## 2020-12-13 PROCEDURE — 250N000013 HC RX MED GY IP 250 OP 250 PS 637: Performed by: INTERNAL MEDICINE

## 2020-12-13 PROCEDURE — 82565 ASSAY OF CREATININE: CPT | Performed by: INTERNAL MEDICINE

## 2020-12-13 PROCEDURE — 80048 BASIC METABOLIC PNL TOTAL CA: CPT | Performed by: INTERNAL MEDICINE

## 2020-12-13 PROCEDURE — 80076 HEPATIC FUNCTION PANEL: CPT | Performed by: INTERNAL MEDICINE

## 2020-12-13 PROCEDURE — 999N001017 HC STATISTIC GLUCOSE BY METER IP

## 2020-12-13 PROCEDURE — 36415 COLL VENOUS BLD VENIPUNCTURE: CPT | Performed by: INTERNAL MEDICINE

## 2020-12-13 PROCEDURE — 84145 PROCALCITONIN (PCT): CPT | Performed by: INTERNAL MEDICINE

## 2020-12-13 PROCEDURE — 120N000001 HC R&B MED SURG/OB

## 2020-12-13 PROCEDURE — 250N000011 HC RX IP 250 OP 636: Performed by: INTERNAL MEDICINE

## 2020-12-13 RX ORDER — LORATADINE 10 MG/1
10 TABLET ORAL DAILY PRN
COMMUNITY

## 2020-12-13 RX ORDER — VENLAFAXINE HYDROCHLORIDE 150 MG/1
300 CAPSULE, EXTENDED RELEASE ORAL DAILY
COMMUNITY

## 2020-12-13 RX ORDER — FLUTICASONE PROPIONATE 50 MCG
1 SPRAY, SUSPENSION (ML) NASAL DAILY PRN
COMMUNITY

## 2020-12-13 RX ORDER — QUETIAPINE FUMARATE 25 MG/1
25 TABLET, FILM COATED ORAL
COMMUNITY

## 2020-12-13 RX ORDER — NYSTATIN 100000 [USP'U]/G
POWDER TOPICAL 4 TIMES DAILY PRN
Status: ON HOLD | COMMUNITY
End: 2020-12-14

## 2020-12-13 RX ORDER — ATORVASTATIN CALCIUM 40 MG/1
40 TABLET, FILM COATED ORAL AT BEDTIME
Status: DISCONTINUED | OUTPATIENT
Start: 2020-12-13 | End: 2020-12-14 | Stop reason: HOSPADM

## 2020-12-13 RX ORDER — ASPIRIN 81 MG/1
81 TABLET ORAL DAILY
COMMUNITY

## 2020-12-13 RX ORDER — IPRATROPIUM BROMIDE AND ALBUTEROL SULFATE 2.5; .5 MG/3ML; MG/3ML
1 SOLUTION RESPIRATORY (INHALATION) EVERY 6 HOURS PRN
COMMUNITY

## 2020-12-13 RX ORDER — METOPROLOL SUCCINATE 50 MG/1
50 TABLET, EXTENDED RELEASE ORAL DAILY
COMMUNITY

## 2020-12-13 RX ORDER — LISINOPRIL 2.5 MG/1
2.5 TABLET ORAL DAILY
COMMUNITY

## 2020-12-13 RX ORDER — ATORVASTATIN CALCIUM 40 MG/1
40 TABLET, FILM COATED ORAL AT BEDTIME
COMMUNITY

## 2020-12-13 RX ORDER — METOPROLOL SUCCINATE 50 MG/1
50 TABLET, EXTENDED RELEASE ORAL DAILY
Status: DISCONTINUED | OUTPATIENT
Start: 2020-12-13 | End: 2020-12-14 | Stop reason: HOSPADM

## 2020-12-13 RX ORDER — NITROGLYCERIN 0.4 MG/1
0.4 TABLET SUBLINGUAL EVERY 5 MIN PRN
COMMUNITY

## 2020-12-13 RX ORDER — ALBUTEROL SULFATE 90 UG/1
2 AEROSOL, METERED RESPIRATORY (INHALATION) 4 TIMES DAILY PRN
COMMUNITY

## 2020-12-13 RX ORDER — OMEPRAZOLE 40 MG/1
40 CAPSULE, DELAYED RELEASE ORAL DAILY
COMMUNITY

## 2020-12-13 RX ORDER — LEVOTHYROXINE SODIUM 100 UG/1
100 TABLET ORAL
Status: DISCONTINUED | OUTPATIENT
Start: 2020-12-14 | End: 2020-12-14 | Stop reason: HOSPADM

## 2020-12-13 RX ORDER — GABAPENTIN 400 MG/1
1200 CAPSULE ORAL 2 TIMES DAILY
Status: DISCONTINUED | OUTPATIENT
Start: 2020-12-13 | End: 2020-12-14 | Stop reason: HOSPADM

## 2020-12-13 RX ORDER — LEVOTHYROXINE SODIUM 100 UG/1
100 TABLET ORAL
COMMUNITY

## 2020-12-13 RX ORDER — ACETAMINOPHEN 500 MG
1000 TABLET ORAL EVERY 6 HOURS PRN
COMMUNITY

## 2020-12-13 RX ORDER — GABAPENTIN 300 MG/1
1200 CAPSULE ORAL 2 TIMES DAILY
COMMUNITY

## 2020-12-13 RX ADMIN — ACETAMINOPHEN 650 MG: 325 TABLET, FILM COATED ORAL at 14:09

## 2020-12-13 RX ADMIN — ACETAMINOPHEN 650 MG: 325 TABLET, FILM COATED ORAL at 21:59

## 2020-12-13 RX ADMIN — GABAPENTIN 1200 MG: 400 CAPSULE ORAL at 11:28

## 2020-12-13 RX ADMIN — METOPROLOL SUCCINATE 50 MG: 50 TABLET, EXTENDED RELEASE ORAL at 11:28

## 2020-12-13 RX ADMIN — ATORVASTATIN CALCIUM 40 MG: 40 TABLET, FILM COATED ORAL at 21:56

## 2020-12-13 RX ADMIN — SODIUM CHLORIDE: 9 INJECTION, SOLUTION INTRAVENOUS at 00:30

## 2020-12-13 RX ADMIN — DEXAMETHASONE 6 MG: 2 TABLET ORAL at 07:57

## 2020-12-13 RX ADMIN — GABAPENTIN 1200 MG: 400 CAPSULE ORAL at 20:27

## 2020-12-13 RX ADMIN — ENOXAPARIN SODIUM 40 MG: 40 INJECTION SUBCUTANEOUS at 00:29

## 2020-12-13 RX ADMIN — OMEPRAZOLE 40 MG: 20 CAPSULE, DELAYED RELEASE ORAL at 11:28

## 2020-12-13 RX ADMIN — ENOXAPARIN SODIUM 40 MG: 40 INJECTION SUBCUTANEOUS at 21:56

## 2020-12-13 ASSESSMENT — ACTIVITIES OF DAILY LIVING (ADL)
ADLS_ACUITY_SCORE: 16
ADLS_ACUITY_SCORE: 19
ADLS_ACUITY_SCORE: 16
ADLS_ACUITY_SCORE: 16
ADLS_ACUITY_SCORE: 15
ADLS_ACUITY_SCORE: 17

## 2020-12-13 ASSESSMENT — MIFFLIN-ST. JEOR: SCORE: 1452.22

## 2020-12-13 NOTE — PLAN OF CARE
A&Ox4 but mildly confused to the details of her situation, VSS, O2 1L NC. LS diminished, infrequent congested cough. Occasional nausea, relieved by water and saltines, pt declined medication. BS active, CMS intact, voiding well. Generalized weakness d/t overall feeling of malaise, SBA w/ GB to bathroom. Airborne/special precautions initiated and maintained. COVID results still pending.

## 2020-12-13 NOTE — PHARMACY-ADMISSION MEDICATION HISTORY
Admission medication history interview status for this patient is complete. See [facility] admission navigator for allergy information, prior to admission medications and immunization status.     Medication history interview done via telephone during Covid-19 pandemic, indicate source(s): Patient  Medication history resources (including written lists, pill bottles, clinic record):Cleo  Pharmacy: [facility] [id number] [place], [place] - [address]     Changes made to PTA medication list:  Added: ALL meds  Deleted: -  Changed: -    Actions taken by pharmacist (provider contacted, etc):None   Additional medication history information:None  Medication reconciliation/reorder completed by provider prior to medication history?  no     Prior to Admission medications    Medication Sig Last Dose Taking? Auth Provider   albuterol (PROAIR HFA/PROVENTIL HFA/VENTOLIN HFA) 108 (90 Base) MCG/ACT inhaler Inhale 2 puffs into the lungs 4 times daily as needed for shortness of breath / dyspnea or wheezing Past Month at Unknown time Yes Unknown, Entered By History   aspirin 81 MG EC tablet Take 81 mg by mouth daily Past Week at Unknown time Yes Unknown, Entered By History   atorvastatin (LIPITOR) 40 MG tablet Take 40 mg by mouth At Bedtime Past Week at Unknown time Yes Unknown, Entered By History   gabapentin (NEURONTIN) 300 MG capsule Take 1,200 mg by mouth 2 times daily Past Week at Unknown time Yes Unknown, Entered By History   ipratropium - albuterol 0.5 mg/2.5 mg/3 mL (DUONEB) 0.5-2.5 (3) MG/3ML neb solution Take 1 vial by nebulization every 6 hours as needed for shortness of breath / dyspnea or wheezing Past Month at Unknown time Yes Unknown, Entered By History   levothyroxine (SYNTHROID/LEVOTHROID) 100 MCG tablet Take 100 mcg by mouth daily before breakfast Past Week at Unknown time Yes Unknown, Entered By History   lisinopril (ZESTRIL) 2.5 MG tablet Take 2.5 mg by mouth daily Past Week at Unknown time Yes Unknown, Entered  By History   metFORMIN (GLUCOPHAGE) 500 MG tablet Take 500 mg by mouth 2 times daily (with meals) Past Week at Unknown time Yes Unknown, Entered By History   metoprolol succinate ER (TOPROL-XL) 50 MG 24 hr tablet Take 50 mg by mouth daily Past Week at Unknown time Yes Unknown, Entered By History   nystatin (MYCOSTATIN) 611762 UNIT/GM external powder Apply topically 4 times daily as needed Apply to affected area Past Month at Unknown time Yes Unknown, Entered By History   omeprazole (PRILOSEC) 40 MG DR capsule Take 40 mg by mouth daily Past Week at Unknown time Yes Unknown, Entered By History   venlafaxine (EFFEXOR-XR) 150 MG 24 hr capsule Take 300 mg by mouth daily Past Week at Unknown time Yes Unknown, Entered By History   acetaminophen (TYLENOL) 500 MG tablet Take 1,000 mg by mouth every 6 hours as needed for mild pain More than a month at Unknown time  Unknown, Entered By History   fluticasone (FLONASE) 50 MCG/ACT nasal spray Spray 1 spray into both nostrils daily as needed for rhinitis or allergies More than a month at Unknown time  Unknown, Entered By History   loratadine (CLARITIN) 10 MG tablet Take 10 mg by mouth daily as needed for allergies More than a month at Unknown time  Unknown, Entered By History   nitroGLYcerin (NITROSTAT) 0.4 MG sublingual tablet Place 0.4 mg under the tongue every 5 minutes as needed for chest pain For chest pain place 1 tablet under the tongue every 5 minutes for 3 doses. If symptoms persist 5 minutes after 1st dose call 911. More than a month at Unknown time  Unknown, Entered By History   QUEtiapine (SEROQUEL) 25 MG tablet Take 25 mg by mouth nightly as needed (insomnia) More than a month at Unknown time  Unknown, Entered By History

## 2020-12-13 NOTE — H&P
Admitted:     12/12/2020      PRIMARY CARE PROVIDER:  Sarahi Castaneda CNP at [facility] in [place], [place]      CHIEF COMPLAINT:  Shortness of breath, fever.      HISTORY OF PRESENT ILLNESS:  Radha Fowler is a 60-year-old with known COPD, not requiring any home oxygen, hypertension, diabetes, hypothyroidism, GERD, heart disease, including bypass surgery, and prior history of methamphetamine use, presents initially at [facility] Emergency Department with fever, shortness of breath and some dental pain.      The patient has had positive sick contacts, including her boyfriend, who may have had COVID.  She is complaining of shortness of breath and fevers up to 101 with associated myalgias, productive cough, headache for almost 2 weeks.  She complains of yellow sputum production.  She has been feeling more wheezing and having more chest tightness.  She most recently underwent dental extraction on 12/05 and has some post-extraction discomfort.  The day after her dental extraction, the patient went in to see her provider and thought that she had an infection, so she has been started on antibiotics and is currently 4 of 7 days into her treatment.  Due to her worsening shortness of breath, the patient came into [facility] for further assessment.      In the hospital, the patient had an ECG, which showed sinus bradycardia with nonspecific ST segment T-wave changes.   Kidney function was normal.  LFTs were also normal with the exception of albumin that is slightly low at 3.3.  Troponin is negative.  ProBNP is 39.  D-dimer is 0.8.  COVID-19 test was sent out.  White count was 4, hemoglobin 12.9, and platelets 182.  Magnesium 1.9.  CRP was 4.8.  Ferritin was 26.  The patient had a CT of the chest which showed no evidence of pulmonary embolism, but prior sternotomy with postsurgical changes from CAB.  She has a few faint regions of minimal ground-glass infiltrates in the lower lobes that are nonspecific but  compatible with inflammation.  There are borderline enlarged mediastinal lymph nodes, likely reactive.  Venous blood gas showed pH of 7.36, pCO2 of 55, pO2 of 22, bicarbonate of 31.  The patient received albuterol nebs with some improvement.  The patient continues to be hypoxic, needing oxygen.  She was given a dose of dexamethasone and given her hypoxia and possible COVID, she was planning on being admitted at [facility], but due to lack of beds, was sent to [facility].      The patient presented at [facility].  She was afebrile, slightly hypertensive, stable oxygenation and mentation.  The patient is currently undergoing COVID rule out precautions and is considered high risk and would certainly recommend a 72-hour repeat if her first one is negative.      PAST MEDICAL HISTORY:   1.  Hypertension.   2.  History of falls.   3.  History of anemia.   4.  Hyperglycemia.   5.  Coronary artery disease including 3-vessel coronary bypass surgery on 08/02/2018 (LIMA to LAD, SVG to intermediate marginal and SVG to distal right coronary).   6.  Chronic right shoulder pain.   7.  History of Bell's palsy.   8.  Type 2 diabetes with diabetic polyneuropathy.   9.  Hypothyroidism.   10.  Excessive daytime sleepiness.   11.  Vitamin D deficiency.   12.  COPD.   13.  Obesity.   14.  Hyperlipidemia.   15.  Menopause.   16.  Nicotine dependence.   17.  Depression/anxiety.   19.   GERD.   20.  History of methamphetamine use.      PAST SURGICAL HISTORY:   1.  Breast lumpectomy.   2.  Diagnostic colonoscopy.   3.  Coronary bypass surgery.      FAMILY HISTORY:  Father with stroke.  Maternal aunt with breast cancer.  Mother with hypertension, hyperlipidemia, rheumatoid arthritis, thyroid disease and valvular disease.      SOCIAL HISTORY:  The patient continues to smoke 1/4 pack for the last 30 years.  Denies any alcohol use.      ALLERGIES:  NO KNOWN DRUG ALLERGIES.      CURRENT MEDICATION LIST:     1.  Aspirin.    2.  Effexor.   3.  Albuterol.   4.  Prilosec.   6.  Sublingual nitroglycerin.   7.  Claritin.   8.  Tylenol.   9.  Flonase.   10.  DuoNebs.   11.  Toprol-XL.   12.  Levothyroxine.   13.  Lisinopril.   14.  Metformin.   15.  Neurontin.   16.  Seroquel.   17.  Flexeril.   18.  Lipitor.   19.  Spiriva.      REVIEW OF SYSTEMS:  Ten points reviewed and as dictated in history of present illness.      PHYSICAL EXAMINATION:   VITAL SIGNS:  Temperature 96.1, heart rate 71, respirations 20, blood pressure 155/74, sats 96% on room air.   GENERAL:  The patient is resting comfortably, no distress, oriented x 3.   HEENT:  Normocephalic, atraumatic.  Mucous membranes are moist.  Noted some tooth extractions.  Pupils are equal.   NECK:  Veins are not elevated.   PULMONARY:  Lungs are diminished at the bases.  Some wheezing noted on the upper lung fields.   GASTROINTESTINAL:  Abdomen is soft, obese, nondistended.  There is no guarding or rebound.   MUSCULOSKELETAL:  No edema.   NEUROLOGIC:  She is able to move all 4 extremities.  Cranial nerves are grossly intact.   SKIN:  Warm, dry, well perfused.   PSYCHIATRIC:  Mood and affect stable.      LABORATORY AND IMAGING STUDIES:  As dictated in history of present illness.  ECG shows sinus bradycardia with nonspecific ST segment T-wave changes.      ASSESSMENT:  Radha Fowler is 60, who has been sick for almost 2 weeks with cough, sputum production, wheezing with known COPD, not on oxygen, currently on antibiotics, recent dental extraction, being admitted for rule out COVID versus chronic obstructive pulmonary disease exacerbation.      PLAN:   1.  Rule out COVID.  The patient will be on COVID precautions.  COVID PCR has been obtained.  The patient is at moderate to high risk, so if her first COVID is negative, she should be retested in 48-72 hours.  We will continue the patient on her Decadron.  CT imaging showed no PE, but a few faint regions of minimal ground-glass infiltrates in  the lower lobes compatible with inflammation.  If the patient is positive for COVID, then we will initiate full COVID labs and interventions, including remdesivir and anticoagulation.  We will check a procalcitonin level.  Of note, the patient already has had 4 days of antibiotics from her primary care physician as she did have COPD.  She may have been partially treated for that or bacterial pneumonia.   2.  Atherosclerotic cardiovascular disease.  EKG shows no acute ischemic changes.  We will await her medication reconciliation, but we will continue the patient on her aspirin, sublingual nitroglycerin, Toprol-XL, ACE inhibitor, and Lipitor.   3.  History of chronic obstructive pulmonary disease.  The patient does have some mild wheezing.  We will continue the patient on all her home COPD medications, but given that she is under PUI for COVID, we are only going to use albuterol metered-dose inhaler, Spiriva.   4  Hypothyroidism.  We will continue the patient on her Synthroid.   5.  Hypertension.  We will continue the patient on metoprolol and lisinopril.   6.  History of diabetes.  We are going to hold the patient's metformin, given her contrast.   7.  History of peripheral neuropathy.  The patient will be continued on her Neurontin.   8.  Hyperlipidemia.  We will continue the patient on her Lipitor.   9.  Reflux disease.  We will continue the patient on a PPI.   10.  Deep venous thrombosis prophylaxis.  The patient will receive compression boots, pending  COVID testing.      CODE STATUS:  FULL.         LANNY MIMS MD             D: 2020   T: 2020   MT: JACKI      Name:     YANY GARBER   MRN:      [id number]        Account:      [id number]   :      1960        Admitted:     2020                   Document: [id number]       cc: Sarahi Castaneda CNP

## 2020-12-13 NOTE — PROVIDER NOTIFICATION
wbp Dr. Bond pt is covid positive. ty    - took call from [facility] and pt did come back covid positive. Provider and bedside RN and charge nurse notified.  1041 12/13/2020

## 2020-12-13 NOTE — PLAN OF CARE
[id number]: Pt resting comfortably. A&O. VSS on RA. Tylenol given for pain. Transfers SBA.  and 144. Transfers SBA. AUO. Family updated. Will continue to monitor.

## 2020-12-13 NOTE — PLAN OF CARE
Pt arrived to [facility] ortho/spine unit at 2325. Pt settled and oriented to room. Admission profile completed. Discussed who should be POC for pt's family, and pt has identified Elvin Robert, roommate, and Steven Fowler, daughter, as family members who will receive information from nursing staff.

## 2020-12-13 NOTE — PROGRESS NOTES
Agree with the note per Dr Baird. Noted to be COVID positive. Not hypoxic. Will stop decadron and no remdesivir indicated.

## 2020-12-13 NOTE — TELEPHONE ENCOUNTER
3-Hospitalist Huddle Documentation    Acuity/Preferred Bed Type: medical bed   Infection Concerns: none and COVID  Additional Specialist Needed Or Specialist Already Contacted:   None  Timely Treatments Needed: No  Important things to know/address during hospitalization: sitter not needed      59 yo with hx of COPD (not on O2), CAD, HTN, DM not on insulin, on going tobacco abuse who comes in for increasing SOB, cough, fever 101 since 12/1. Spouse likely has COVID (loss of taste and smell, got tested today).   Dental work on 12/5 for extraction but sites ok. Does not look infected. Was already feeling bad prior to dental work.   O2 83% with activity. Now on 2L with Sats 96%   CT negative for PE, faint ground-glass at the lower lobe.   Albuterol inhaler helped.    Dexamethasone 6mg given.    Admit to med bed for acute hypoxic resp failure likely due to COVID 19   Symptomatic test pending  Possibly concomitant COVID exacerbation.   No abx at time given normal WBC. LA.

## 2020-12-14 VITALS
BODY MASS INDEX: 35.72 KG/M2 | WEIGHT: 201.6 LBS | OXYGEN SATURATION: 94 % | HEIGHT: 63 IN | HEART RATE: 63 BPM | SYSTOLIC BLOOD PRESSURE: 169 MMHG | RESPIRATION RATE: 16 BRPM | DIASTOLIC BLOOD PRESSURE: 67 MMHG | TEMPERATURE: 98.9 F

## 2020-12-14 LAB
GLUCOSE BLDC GLUCOMTR-MCNC: 109 MG/DL (ref 70–99)
GLUCOSE BLDC GLUCOMTR-MCNC: 129 MG/DL (ref 70–99)
GLUCOSE BLDC GLUCOMTR-MCNC: 154 MG/DL (ref 70–99)

## 2020-12-14 PROCEDURE — 250N000011 HC RX IP 250 OP 636: Performed by: INTERNAL MEDICINE

## 2020-12-14 PROCEDURE — 250N000013 HC RX MED GY IP 250 OP 250 PS 637: Performed by: INTERNAL MEDICINE

## 2020-12-14 PROCEDURE — 94640 AIRWAY INHALATION TREATMENT: CPT

## 2020-12-14 PROCEDURE — 999N001017 HC STATISTIC GLUCOSE BY METER IP

## 2020-12-14 PROCEDURE — 250N000012 HC RX MED GY IP 250 OP 636 PS 637: Performed by: INTERNAL MEDICINE

## 2020-12-14 PROCEDURE — 999N000157 HC STATISTIC RCP TIME EA 10 MIN

## 2020-12-14 PROCEDURE — 99239 HOSP IP/OBS DSCHRG MGMT >30: CPT | Performed by: INTERNAL MEDICINE

## 2020-12-14 RX ORDER — PREDNISONE 20 MG/1
40 TABLET ORAL DAILY
Qty: 4 TABLET | Refills: 0 | Status: SHIPPED | OUTPATIENT
Start: 2020-12-14 | End: 2020-12-16

## 2020-12-14 RX ORDER — LORAZEPAM 2 MG/ML
0.5 INJECTION INTRAMUSCULAR ONCE
Status: COMPLETED | OUTPATIENT
Start: 2020-12-14 | End: 2020-12-14

## 2020-12-14 RX ORDER — ASPIRIN 81 MG/1
81 TABLET ORAL DAILY
Status: DISCONTINUED | OUTPATIENT
Start: 2020-12-14 | End: 2020-12-14 | Stop reason: HOSPADM

## 2020-12-14 RX ORDER — PREDNISONE 20 MG/1
40 TABLET ORAL DAILY
Status: DISCONTINUED | OUTPATIENT
Start: 2020-12-14 | End: 2020-12-14 | Stop reason: HOSPADM

## 2020-12-14 RX ORDER — QUETIAPINE FUMARATE 25 MG/1
25 TABLET, FILM COATED ORAL
Status: DISCONTINUED | OUTPATIENT
Start: 2020-12-14 | End: 2020-12-14 | Stop reason: HOSPADM

## 2020-12-14 RX ORDER — VENLAFAXINE HYDROCHLORIDE 150 MG/1
300 CAPSULE, EXTENDED RELEASE ORAL DAILY
Status: DISCONTINUED | OUTPATIENT
Start: 2020-12-14 | End: 2020-12-14 | Stop reason: HOSPADM

## 2020-12-14 RX ORDER — LORAZEPAM 0.5 MG/1
0.5 TABLET ORAL EVERY 4 HOURS PRN
Status: DISCONTINUED | OUTPATIENT
Start: 2020-12-14 | End: 2020-12-14 | Stop reason: HOSPADM

## 2020-12-14 RX ORDER — LISINOPRIL 2.5 MG/1
2.5 TABLET ORAL DAILY
Status: DISCONTINUED | OUTPATIENT
Start: 2020-12-14 | End: 2020-12-14 | Stop reason: HOSPADM

## 2020-12-14 RX ADMIN — UMECLIDINIUM BROMIDE AND VILANTEROL TRIFENATATE 1 PUFF: 62.5; 25 POWDER RESPIRATORY (INHALATION) at 07:06

## 2020-12-14 RX ADMIN — OMEPRAZOLE 40 MG: 20 CAPSULE, DELAYED RELEASE ORAL at 08:09

## 2020-12-14 RX ADMIN — LORAZEPAM 0.5 MG: 0.5 TABLET ORAL at 06:41

## 2020-12-14 RX ADMIN — PREDNISONE 40 MG: 20 TABLET ORAL at 09:58

## 2020-12-14 RX ADMIN — GABAPENTIN 1200 MG: 400 CAPSULE ORAL at 08:09

## 2020-12-14 RX ADMIN — LORAZEPAM 0.5 MG: 2 INJECTION INTRAMUSCULAR; INTRAVENOUS at 03:05

## 2020-12-14 RX ADMIN — VENLAFAXINE HYDROCHLORIDE 300 MG: 150 CAPSULE, EXTENDED RELEASE ORAL at 09:57

## 2020-12-14 RX ADMIN — METOPROLOL SUCCINATE 50 MG: 50 TABLET, EXTENDED RELEASE ORAL at 08:09

## 2020-12-14 RX ADMIN — LEVOTHYROXINE SODIUM 100 MCG: 0.1 TABLET ORAL at 06:41

## 2020-12-14 RX ADMIN — ASPIRIN 81 MG: 81 TABLET, COATED ORAL at 09:58

## 2020-12-14 RX ADMIN — LISINOPRIL 2.5 MG: 2.5 TABLET ORAL at 09:58

## 2020-12-14 ASSESSMENT — ACTIVITIES OF DAILY LIVING (ADL)
ADLS_ACUITY_SCORE: 16
ADLS_ACUITY_SCORE: 17

## 2020-12-14 ASSESSMENT — MIFFLIN-ST. JEOR: SCORE: 1453.58

## 2020-12-14 NOTE — PLAN OF CARE
A/O.  O2 sats stable on room air. Started on Prednisone today-will continue for 2 days at home.  Prescription for Prednisone given to pt.  Discharge instructions given to pt. Verbalizes understanding. Personal belongings gathered.  Awaiting ride from significant other, Elvin.  Spoke with Elvin on the phone earlier about watching pt for any worsening symptoms once at home.  Also instructed that pt would be sent home on Prednisone but had no oxygen needs.  Verbalized understanding.

## 2020-12-14 NOTE — PLAN OF CARE
Overnight, pt appeared to enter into withdrawal from methamphetamines. Severe agitation and anxiety, confusion, dizziness, headache, and hypertension. Last meth use on Friday 12/11. IV ativan given x1, PO ativan x1 for continued symptoms. Pt stated that her family and significant other are unaware of her current regular use. Pt has requested that nursing staff not share these details with her family. She has gone through treatment previously, but unsuccessful.     2L O2, up Ax2 w/ GB d/t intense unsteadiness. LS diminished, BS active, CMS intact. Voiding well, tolerating regular diet but decreased appetite. One episode of coughing which quickly resolved. Will continue monitor.

## 2020-12-14 NOTE — PROVIDER NOTIFICATION
"0247 MD paged \"Pt actively withdrawing from meth. Denied use up until now. Last use was Friday prior to admission. Agitation, elevated BP, confusion, dizziness. Would you like to do anything or just continue to monitor?\"    0258: spoke with MD, ativan IV administered.    0332: Pt appears slightly sedated with ativan, wakes to touch but calm and oriented when awake. Denies headache.  "

## 2020-12-14 NOTE — DISCHARGE SUMMARY
"[facility]  Hospitalist Discharge Summary       Date of Admission:  12/12/2020  Date of Discharge:  12/14/2020  Discharging Providera: Abel Ferguson MD      Discharge Diagnoses   COVID-19 infection  COPD exacerbation    Follow-ups Needed After Discharge   Follow-up Appointments     Follow-up and recommended labs and tests       Follow up with primary care provider, [facility],   within 7 days for hospital follow up.               Discharge Disposition   Discharged to home  Condition at discharge: Stable    History of Present Illness   Per admission H&P:  \"Radha Fowler is a 60-year-old with known COPD, not requiring any home oxygen, hypertension, diabetes, hypothyroidism, GERD, heart disease, including bypass surgery, and prior history of methamphetamine use, presents initially at [facility] Emergency Department with fever, shortness of breath and some dental pain.      The patient has had positive sick contacts, including her boyfriend, who may have had COVID.  She is complaining of shortness of breath and fevers up to 101 with associated myalgias, productive cough, headache for almost 2 weeks.  She complains of yellow sputum production.  She has been feeling more wheezing and having more chest tightness.  She most recently underwent dental extraction on 12/05 and has some post-extraction discomfort.  The day after her dental extraction, the patient went in to see her provider and thought that she had an infection, so she has been started on antibiotics and is currently 4 of 7 days into her treatment.  Due to her worsening shortness of breath, the patient came into [facility] for further assessment.\"    Hospital Course     COVID-19 infection  COPD exacerbation  As outlined above, patient presented to the hospital complaining of shortness of breath and fevers.  Noted to have a Covid positive contact of her boyfriend.  She had presented to a local " hospital.  CT PE protocol was done and negative for PE with some very faint minimal groundglass opacities in the bilateral lower lobes.  She was felt to be hypoxic at the outside hospital with an O2 saturation of 83%.  Therefore was given 6 mg IV dexamethasone and transferred here for admission.  COVID-19 testing returned positive.  Here, she was able to be weaned to room air.  She will be discharged on prednisone for couple more days not because of Covid, but because of COPD exacerbation.  She said she had sufficient inhalers in the like at home.  Given the mild severity of her Covid infection, she does not need to be discharged on any prophylactic anticoagulation.    Consultations This Hospital Stay   None      Code Status   Full Code    Time Spent on this Encounter   I, Abel Ferguson MD, personally saw the patient today and spent greater than 30 minutes discharging this patient.       Abel Ferguson MD  [facility]  ______________________________________________________________________    Physical Exam   Vital Signs: Temp: 97.5  F (36.4  C) Temp src: Temporal BP: 121/57 Pulse: 61   Resp: 16 SpO2: 94 % O2 Device: None (Room air)(while asleep) Oxygen Delivery: 2 LPM(titrated to 1L)  Weight: 201 lbs 9.6 oz      General: No acute distress, looks comfortable.    HEENT: No scleral icterus. Oropharynx moist.     Neck: Supple. Normal range of motion.    Pulmonary: Normal work of breathing. Clear to auscultation bilaterally.    Cardiovascular: Regular rate and rhythm without murmur or extra heart sounds.    Abdomen: Soft and non-tender.    Extremities: No peripheral edema. No clubbing.    Neurologic: Awake, alert, appropriate.    Skin: Warm and dry.    Psychiatric: Normal affect and mood.       Primary Care Physician   [facility]    Discharge Orders      COVID-19 GetWell Loop Referral      Reason for your hospital stay    Shortness of breath due to COVID 19  infection     Follow-up and recommended labs and tests     Follow up with primary care provider, [facility], within 7 days for hospital follow up.     Contact provider    Contact your primary care provider if If increased trouble breathing, new arm/leg swelling, dizziness/passing out, falls, bleeding that doesn't stop, or uncontrolled pain.     Discharge - Quarantine/Isolation Instruction    Date of symptom onset:     Date of first positive test:    If you tested positive COVID-19 and show symptoms (fever, cough, body aches or trouble breathing):        Stay home and away from others (self-isolate) until:        At least 10 days have passed since your symptoms started. AND...        You've had no fever-and no medicine that reduces fever-for 3 full days (72 hours). AND...         Your other symptoms have resolved (gotten better).  If you tested positive for COVID-19 but don't show symptoms:       Stay home and away from others (self-isolate) until at least 10 days have passed since the date of your first positive COVID-19 test.     Activity    Your activity upon discharge: activity as tolerated     Diet    Follow this diet upon discharge: Regular       Significant Results and Procedures   Most Recent 3 CBC's:  Recent Labs   Lab Test 12/13/20  0650 12/12/20  1645 12/12/20  1621   WBC 2.4* 4.0 Canceled, Test credited   HGB 13.2 12.9 Canceled, Test credited   MCV 94 90 Canceled, Test credited    182 Canceled, Test credited     Most Recent 3 BMP's:  Recent Labs   Lab Test 12/13/20  0650 12/12/20  1621    139   POTASSIUM 4.7 5.0   CHLORIDE 107 107   CO2 30 29   BUN 17 12   CR 0.88 0.82   ANIONGAP 2* 3   ANDRÉS 8.7 8.4*   * 80     Most Recent 2 LFT's:  Recent Labs   Lab Test 12/13/20  0650 12/12/20  1621   AST 14 19   ALT 16 18   ALKPHOS 116 121   BILITOTAL 0.3 0.3     Most Recent 3 INR's:No lab results found.,   Results for orders placed or performed during the hospital encounter  of 12/12/20   CT Chest Pulmonary Embolism w Contrast    Narrative    EXAM: CT CHEST PULMONARY EMBOLISM W CONTRAST  LOCATION: [facility]  DATE/TIME: 12/12/2020 5:27 PM    INDICATION: Shortness of breath with fevers and cough.  COMPARISON: None.  TECHNIQUE: CT chest pulmonary angiogram during arterial phase injection of IV contrast. Multiplanar reformats and MIP reconstructions were performed. Dose reduction techniques were used.   CONTRAST: 83 mL Isovue-370.    FINDINGS:  ANGIOGRAM CHEST: Pulmonary arteries are normal caliber and negative for pulmonary emboli. Thoracic aorta is negative for dissection. No CT evidence of right heart strain.    LUNGS AND PLEURA: A few faint minimal regions of ground-glass infiltrates within the periphery of the lower lobes and right middle lobe nonspecific.    MEDIASTINUM/AXILLAE: Sternotomy. There are a few borderline enlarged mediastinal nodes which are probably reactive. Small esophageal hiatal hernia.    UPPER ABDOMEN: Normal.    MUSCULOSKELETAL: Normal.      Impression    IMPRESSION:  1.  No pulmonary embolism.    2.  Previous sternotomy with postsurgical changes of CABG.    3.  A few very faint regions of minimal ground-glass infiltrates in the lower lobes nonspecific but compatible with inflammation.    4.  Borderline enlarged mediastinal lymph nodes likely reactive.       Discharge Medications   Current Discharge Medication List      START taking these medications    Details   predniSONE (DELTASONE) 20 MG tablet Take 2 tablets (40 mg) by mouth daily for 2 days  Qty: 4 tablet, Refills: 0    Associated Diagnoses: COVID-19         CONTINUE these medications which have NOT CHANGED    Details   albuterol (PROAIR HFA/PROVENTIL HFA/VENTOLIN HFA) 108 (90 Base) MCG/ACT inhaler Inhale 2 puffs into the lungs 4 times daily as needed for shortness of breath / dyspnea or wheezing    Comments: Pharmacy may dispense brand covered by insurance (Proair, or proventil or ventolin or  generic albuterol inhaler)      aspirin 81 MG EC tablet Take 81 mg by mouth daily      atorvastatin (LIPITOR) 40 MG tablet Take 40 mg by mouth At Bedtime      gabapentin (NEURONTIN) 300 MG capsule Take 1,200 mg by mouth 2 times daily      ipratropium - albuterol 0.5 mg/2.5 mg/3 mL (DUONEB) 0.5-2.5 (3) MG/3ML neb solution Take 1 vial by nebulization every 6 hours as needed for shortness of breath / dyspnea or wheezing      levothyroxine (SYNTHROID/LEVOTHROID) 100 MCG tablet Take 100 mcg by mouth daily before breakfast      lisinopril (ZESTRIL) 2.5 MG tablet Take 2.5 mg by mouth daily      metFORMIN (GLUCOPHAGE) 500 MG tablet Take 500 mg by mouth 2 times daily (with meals)      metoprolol succinate ER (TOPROL-XL) 50 MG 24 hr tablet Take 50 mg by mouth daily      omeprazole (PRILOSEC) 40 MG DR capsule Take 40 mg by mouth daily      venlafaxine (EFFEXOR-XR) 150 MG 24 hr capsule Take 300 mg by mouth daily      acetaminophen (TYLENOL) 500 MG tablet Take 1,000 mg by mouth every 6 hours as needed for mild pain      fluticasone (FLONASE) 50 MCG/ACT nasal spray Spray 1 spray into both nostrils daily as needed for rhinitis or allergies      loratadine (CLARITIN) 10 MG tablet Take 10 mg by mouth daily as needed for allergies      nitroGLYcerin (NITROSTAT) 0.4 MG sublingual tablet Place 0.4 mg under the tongue every 5 minutes as needed for chest pain For chest pain place 1 tablet under the tongue every 5 minutes for 3 doses. If symptoms persist 5 minutes after 1st dose call 911.      QUEtiapine (SEROQUEL) 25 MG tablet Take 25 mg by mouth nightly as needed (insomnia)           Allergies   No Known Allergies

## 2020-12-14 NOTE — PLAN OF CARE
Pt on room air most of the time until she wanted to have some oxygen as she was going to sleep for comfort. Pt mostly complaining of ongoing headache and toothache. Pt claims that she used a heating pad at home for the toothache but she did not want one here. Pt also declined to have a nicotine patch or gum. Daughter Geraldine updated during shift. Will keep monitoring.

## 2020-12-14 NOTE — PROGRESS NOTES
Pt able to ambulate to bathroom and in room with SBA.  O2 sats remained 92-95% on RA throughout duration of ambulation.  Denies feeling short of breath.

## 2020-12-14 NOTE — PROGRESS NOTES
Cross cover paged regarding patient with severe anxiety/agitation, mild confusion, and dizziness when moving around.  Blood pressures elevated 165/66 and heart rate is 56.  The patient admitted to using methamphetamines with last use 2 nights ago.  Suspect she does have some withdrawal from this.  She is here for COVID-19, but respiratory status appears stable.  -Give 0.5 mg IV Ativan now then oral Ativan 0.5 mg every 4 hours as needed.  Monitor for effect.  -Considered clonidine, she is mildly bradycardic

## 2024-12-07 ENCOUNTER — HOSPITAL ENCOUNTER (EMERGENCY)
Facility: CLINIC | Age: 64
Discharge: HOME | End: 2024-12-07
Attending: FAMILY MEDICINE | Admitting: FAMILY MEDICINE
Payer: COMMERCIAL

## 2024-12-07 ENCOUNTER — APPOINTMENT (OUTPATIENT)
Dept: CT IMAGING | Facility: CLINIC | Age: 64
End: 2024-12-07
Attending: FAMILY MEDICINE
Payer: COMMERCIAL

## 2024-12-07 VITALS
TEMPERATURE: 97.8 F | DIASTOLIC BLOOD PRESSURE: 94 MMHG | HEART RATE: 66 BPM | OXYGEN SATURATION: 94 % | BODY MASS INDEX: 36.87 KG/M2 | SYSTOLIC BLOOD PRESSURE: 164 MMHG | RESPIRATION RATE: 19 BRPM | HEIGHT: 62 IN

## 2024-12-07 DIAGNOSIS — J44.1 CHRONIC OBSTRUCTIVE PULMONARY DISEASE WITH ACUTE EXACERBATION (H): ICD-10-CM

## 2024-12-07 LAB
ANION GAP SERPL CALCULATED.3IONS-SCNC: 11 MMOL/L (ref 7–15)
BASOPHILS # BLD AUTO: 0.1 10E3/UL (ref 0–0.2)
BASOPHILS NFR BLD AUTO: 1 %
BUN SERPL-MCNC: 9.6 MG/DL (ref 8–23)
CALCIUM SERPL-MCNC: 9.1 MG/DL (ref 8.8–10.4)
CHLORIDE SERPL-SCNC: 102 MMOL/L (ref 98–107)
CREAT SERPL-MCNC: 0.94 MG/DL (ref 0.51–0.95)
EGFRCR SERPLBLD CKD-EPI 2021: 67 ML/MIN/1.73M2
EOSINOPHIL # BLD AUTO: 0.2 10E3/UL (ref 0–0.7)
EOSINOPHIL NFR BLD AUTO: 3 %
ERYTHROCYTE [DISTWIDTH] IN BLOOD BY AUTOMATED COUNT: 14.7 % (ref 10–15)
FLUAV RNA SPEC QL NAA+PROBE: NEGATIVE
FLUBV RNA RESP QL NAA+PROBE: NEGATIVE
GLUCOSE SERPL-MCNC: 125 MG/DL (ref 70–99)
HCO3 SERPL-SCNC: 27 MMOL/L (ref 22–29)
HCT VFR BLD AUTO: 44.7 % (ref 35–47)
HGB BLD-MCNC: 13.8 G/DL (ref 11.7–15.7)
HOLD SPECIMEN: NORMAL
HOLD SPECIMEN: NORMAL
IMM GRANULOCYTES # BLD: 0.1 10E3/UL
IMM GRANULOCYTES NFR BLD: 1 %
LYMPHOCYTES # BLD AUTO: 2.2 10E3/UL (ref 0.8–5.3)
LYMPHOCYTES NFR BLD AUTO: 38 %
MCH RBC QN AUTO: 28.4 PG (ref 26.5–33)
MCHC RBC AUTO-ENTMCNC: 30.9 G/DL (ref 31.5–36.5)
MCV RBC AUTO: 92 FL (ref 78–100)
MONOCYTES # BLD AUTO: 0.4 10E3/UL (ref 0–1.3)
MONOCYTES NFR BLD AUTO: 8 %
NEUTROPHILS # BLD AUTO: 2.8 10E3/UL (ref 1.6–8.3)
NEUTROPHILS NFR BLD AUTO: 49 %
NRBC # BLD AUTO: 0 10E3/UL
NRBC BLD AUTO-RTO: 0 /100
PLATELET # BLD AUTO: 227 10E3/UL (ref 150–450)
POTASSIUM SERPL-SCNC: 4 MMOL/L (ref 3.4–5.3)
RBC # BLD AUTO: 4.86 10E6/UL (ref 3.8–5.2)
RSV RNA SPEC NAA+PROBE: NEGATIVE
SARS-COV-2 RNA RESP QL NAA+PROBE: NEGATIVE
SODIUM SERPL-SCNC: 140 MMOL/L (ref 135–145)
WBC # BLD AUTO: 5.7 10E3/UL (ref 4–11)

## 2024-12-07 PROCEDURE — 85025 COMPLETE CBC W/AUTO DIFF WBC: CPT | Performed by: FAMILY MEDICINE

## 2024-12-07 PROCEDURE — 250N000012 HC RX MED GY IP 250 OP 636 PS 637: Performed by: FAMILY MEDICINE

## 2024-12-07 PROCEDURE — 80048 BASIC METABOLIC PNL TOTAL CA: CPT | Performed by: FAMILY MEDICINE

## 2024-12-07 PROCEDURE — 36415 COLL VENOUS BLD VENIPUNCTURE: CPT | Performed by: FAMILY MEDICINE

## 2024-12-07 PROCEDURE — 87637 SARSCOV2&INF A&B&RSV AMP PRB: CPT | Performed by: FAMILY MEDICINE

## 2024-12-07 PROCEDURE — 94640 AIRWAY INHALATION TREATMENT: CPT

## 2024-12-07 PROCEDURE — 71250 CT THORAX DX C-: CPT

## 2024-12-07 PROCEDURE — 250N000009 HC RX 250: Performed by: FAMILY MEDICINE

## 2024-12-07 PROCEDURE — 85041 AUTOMATED RBC COUNT: CPT | Performed by: FAMILY MEDICINE

## 2024-12-07 PROCEDURE — 99284 EMERGENCY DEPT VISIT MOD MDM: CPT | Performed by: FAMILY MEDICINE

## 2024-12-07 PROCEDURE — 99284 EMERGENCY DEPT VISIT MOD MDM: CPT | Mod: 25 | Performed by: FAMILY MEDICINE

## 2024-12-07 RX ORDER — PREDNISONE 20 MG/1
40 TABLET ORAL ONCE
Status: COMPLETED | OUTPATIENT
Start: 2024-12-07 | End: 2024-12-07

## 2024-12-07 RX ORDER — PREDNISONE 20 MG/1
40 TABLET ORAL DAILY
Qty: 10 TABLET | Refills: 0 | Status: SHIPPED | OUTPATIENT
Start: 2024-12-07 | End: 2024-12-12

## 2024-12-07 RX ORDER — IPRATROPIUM BROMIDE AND ALBUTEROL SULFATE 2.5; .5 MG/3ML; MG/3ML
3 SOLUTION RESPIRATORY (INHALATION) ONCE
Status: COMPLETED | OUTPATIENT
Start: 2024-12-07 | End: 2024-12-07

## 2024-12-07 RX ORDER — DOXYCYCLINE 100 MG/1
100 CAPSULE ORAL 2 TIMES DAILY
Qty: 14 CAPSULE | Refills: 0 | Status: SHIPPED | OUTPATIENT
Start: 2024-12-07 | End: 2024-12-14

## 2024-12-07 RX ADMIN — IPRATROPIUM BROMIDE AND ALBUTEROL SULFATE 3 ML: 2.5; .5 SOLUTION RESPIRATORY (INHALATION) at 12:37

## 2024-12-07 RX ADMIN — PREDNISONE 40 MG: 20 TABLET ORAL at 13:52

## 2024-12-07 ASSESSMENT — COLUMBIA-SUICIDE SEVERITY RATING SCALE - C-SSRS
1. IN THE PAST MONTH, HAVE YOU WISHED YOU WERE DEAD OR WISHED YOU COULD GO TO SLEEP AND NOT WAKE UP?: NO
2. HAVE YOU ACTUALLY HAD ANY THOUGHTS OF KILLING YOURSELF IN THE PAST MONTH?: NO
6. HAVE YOU EVER DONE ANYTHING, STARTED TO DO ANYTHING, OR PREPARED TO DO ANYTHING TO END YOUR LIFE?: NO

## 2024-12-07 ASSESSMENT — ACTIVITIES OF DAILY LIVING (ADL)
ADLS_ACUITY_SCORE: 47

## 2024-12-07 NOTE — ED PROVIDER NOTES
History     Chief Complaint   Patient presents with    Shortness of Breath    Cough     HPI  Radha Fowler is a 64 year old female who comes in from home with her  with cough, chills, shortness of breath associated with feeling respiratory infection for several days to 1 week.  She has been coughing and chilled and had more difficulty breathing.  She has a history of COPD.  She continues to smoke.  She says she is cut down since she became ill but still smokes before bed.  She does not use any alcohol.  She is not having chest pain.  She has not been able to have a bowel movement for the past week.  Her urination is increased but not painful.  She has a history of type 2 diabetes but is not on insulin and is not checking her blood sugars.  She did not get an influenza vaccination this fall.    Allergies:  No Known Allergies    Problem List:    Patient Active Problem List    Diagnosis Date Noted    Fever 12/12/2020     Priority: Medium        Past Medical History:    No past medical history on file.    Past Surgical History:    No past surgical history on file.    Family History:    No family history on file.    Social History:  Marital Status:  Single [1]        Medications:    acetaminophen (TYLENOL) 500 MG tablet  albuterol (PROAIR HFA/PROVENTIL HFA/VENTOLIN HFA) 108 (90 Base) MCG/ACT inhaler  aspirin 81 MG EC tablet  atorvastatin (LIPITOR) 40 MG tablet  doxycycline hyclate (VIBRAMYCIN) 100 MG capsule  fluticasone (FLONASE) 50 MCG/ACT nasal spray  gabapentin (NEURONTIN) 300 MG capsule  ipratropium - albuterol 0.5 mg/2.5 mg/3 mL (DUONEB) 0.5-2.5 (3) MG/3ML neb solution  levothyroxine (SYNTHROID/LEVOTHROID) 100 MCG tablet  lisinopril (ZESTRIL) 2.5 MG tablet  loratadine (CLARITIN) 10 MG tablet  metFORMIN (GLUCOPHAGE) 500 MG tablet  metoprolol succinate ER (TOPROL-XL) 50 MG 24 hr tablet  nitroGLYcerin (NITROSTAT) 0.4 MG sublingual tablet  omeprazole (PRILOSEC) 40 MG DR capsule  predniSONE (DELTASONE) 20  "MG tablet  QUEtiapine (SEROQUEL) 25 MG tablet  venlafaxine (EFFEXOR-XR) 150 MG 24 hr capsule          Review of Systems  All other systems are reviewed and are negative    Physical Exam   BP: (!) 166/97  Pulse: 70  Temp: 97.8  F (36.6  C)  Resp: 18  Height: 157.5 cm (5' 2\")  SpO2: 92 %      Physical Exam    Nursing note and vitals were reviewed.  Constitutional: Sleepy but arousable, and mildly psychomotor slowed 64-year-old in no apparent discomfort, who appears moderately ill, and who answers questions appropriately and cooperates with examination.  HEENT: Speech is slow but fluent.  Voice quality is normal.  PERRL.  EOMI.   Neck: Freely mobile.  Cardiovascular: Cardiac examination reveals normal heart rate and regular rhythm without murmur.  Pulmonary/Chest: Breathing is mildly labored with normal respiratory rate but moderate prolongation of the expiratory phase and wheezing throughout the lung fields without retractions.  Able to speak in full sentences.  O2 sats are 90 to 94% on room air.  Abdomen: Soft, nontender, no HSM or masses rebound or guarding.  Musculoskeletal: Extremities are warm and well-perfused and without edema  Neurological: Alert, oriented, thought content logical, coherent   Skin: Warm, dry, no rashes.  Psychiatric: Affect congruent with acute illness.    ED Course        Procedures              Critical Care time:  none         Results for orders placed or performed during the hospital encounter of 12/07/24 (from the past 24 hours)   CBC with platelets, differential    Narrative    The following orders were created for panel order CBC with platelets, differential.  Procedure                               Abnormality         Status                     ---------                               -----------         ------                     CBC with platelets and d...[[id number]]  Abnormal            Final result                 Please view results for these tests on the individual orders.   Basic " metabolic panel   Result Value Ref Range    Sodium 140 135 - 145 mmol/L    Potassium 4.0 3.4 - 5.3 mmol/L    Chloride 102 98 - 107 mmol/L    Carbon Dioxide (CO2) 27 22 - 29 mmol/L    Anion Gap 11 7 - 15 mmol/L    Urea Nitrogen 9.6 8.0 - 23.0 mg/dL    Creatinine 0.94 0.51 - 0.95 mg/dL    GFR Estimate 67 >60 mL/min/1.73m2    Calcium 9.1 8.8 - 10.4 mg/dL    Glucose 125 (H) 70 - 99 mg/dL   [place] Draw    Narrative    The following orders were created for panel order [place] Draw.  Procedure                               Abnormality         Status                     ---------                               -----------         ------                     Extra Blue Top Tube[[id number]]                              Final result               Extra Red Top Tube[[id number]]                               Final result                 Please view results for these tests on the individual orders.   CBC with platelets and differential   Result Value Ref Range    WBC Count 5.7 4.0 - 11.0 10e3/uL    RBC Count 4.86 3.80 - 5.20 10e6/uL    Hemoglobin 13.8 11.7 - 15.7 g/dL    Hematocrit 44.7 35.0 - 47.0 %    MCV 92 78 - 100 fL    MCH 28.4 26.5 - 33.0 pg    MCHC 30.9 (L) 31.5 - 36.5 g/dL    RDW 14.7 10.0 - 15.0 %    Platelet Count 227 150 - 450 10e3/uL    % Neutrophils 49 %    % Lymphocytes 38 %    % Monocytes 8 %    % Eosinophils 3 %    % Basophils 1 %    % Immature Granulocytes 1 %    NRBCs per 100 WBC 0 <1 /100    Absolute Neutrophils 2.8 1.6 - 8.3 10e3/uL    Absolute Lymphocytes 2.2 0.8 - 5.3 10e3/uL    Absolute Monocytes 0.4 0.0 - 1.3 10e3/uL    Absolute Eosinophils 0.2 0.0 - 0.7 10e3/uL    Absolute Basophils 0.1 0.0 - 0.2 10e3/uL    Absolute Immature Granulocytes 0.1 <=0.4 10e3/uL    Absolute NRBCs 0.0 10e3/uL   Extra Blue Top Tube   Result Value Ref Range    Hold Specimen JIC    Extra Red Top Tube   Result Value Ref Range    Hold Specimen JIC    Influenza A/B, RSV and SARS-CoV2 PCR (COVID-19) Nose    Specimen: Nose; Swab   Result  Value Ref Range    Influenza A PCR Negative Negative    Influenza B PCR Negative Negative    RSV PCR Negative Negative    SARS CoV2 PCR Negative Negative    Narrative    Testing was performed using the Xpert Xpress CoV2/Flu/RSV Assay on the [employer] GeneXpert Instrument. This test should be ordered for the detection of SARS-CoV2, influenza, and RSV viruses in individuals with signs and symptoms of respiratory tract infection. This test is for in vitro diagnostic use under the [place] FDA for laboratories certified under CLIA to perform high or moderate complexity testing. This test has been [place] FDA cleared. A negative result does not rule out the presence of PCR inhibitors in the specimen or target RNA in concentration below the limit of detection for the assay. If only one viral target is positive but coinfection with multiple targets is suspected, the sample should be re-tested with another FDA cleared, approved, or authorized test, if coninfection would change clinical management. This test was validated by the [facility]. These laboratories are certified under the Clinical Laboratory Improvement Amendments of 1988 (CLIA-88) as qualified to perfom high complexity laboratory testing.   Chest CT w/o contrast    Narrative    EXAM: CT CHEST WITHOUT CONTRAST  LOCATION: [facility]  DATE: 12/07/2024    INDICATION: Fever, cough, dyspnea.  COMPARISON: 12/12/2020.  TECHNIQUE: CT chest without IV contrast. Multiplanar reformats were obtained. Dose reduction techniques were used.  CONTRAST: None.    FINDINGS:   LUNGS AND PLEURA: Lungs are clear. No pleural effusion.    MEDIASTINUM/AXILLAE: No adenopathy demonstrated in the absence of contrast. No aneurysm.    CORONARY ARTERY CALCIFICATION: Coronary artery bypass change.    UPPER ABDOMEN: No acute findings.    MUSCULOSKELETAL: No frankly destructive bony lesions.      Impression    IMPRESSION:   No acute process demonstrated.          Medications   predniSONE (DELTASONE) tablet 40 mg (40 mg Oral $Given 12/7/24 1053)   ipratropium - albuterol 0.5 mg/2.5 mg/3 mL (DUONEB) neb solution 3 mL (3 mLs Nebulization $Given 12/7/24 4771)       Assessments & Plan (with Medical Decision Making)     64-year-old female with a history of COPD presents with about a week of cough congestion myalgias and increased dyspnea healthy breathing.  He has widespread wheezing on physical examination.  O2 sats range 9094% on room air.  She received prednisone and a DuoNeb and had improvement in her symptoms.  Noncontrast chest CT was essentially normal.  There is no sign of pneumonia pneumothorax or other cause for her symptoms.  I suspect this is acute exacerbation of COPD likely due to a viral trigger.  Influenza, RSV, COVID testing is negative.  Will treat for COPD exacerbation with doxycycline, prednisone, bronchodilators.  Signs and symptoms that should prompt return to the emergency department were reviewed with her and her .  They expressed understanding of the assessment and plan.  Recommended she continue to work on quitting smoking and referred her to [employer]     I have reviewed the nursing notes.    I have reviewed the findings, diagnosis, plan and need for follow up with the patient.         New Prescriptions    DOXYCYCLINE HYCLATE (VIBRAMYCIN) 100 MG CAPSULE    Take 1 capsule (100 mg) by mouth 2 times daily for 7 days.    PREDNISONE (DELTASONE) 20 MG TABLET    Take 2 tablets (40 mg) by mouth daily for 5 days.       Final diagnoses:   Chronic obstructive pulmonary disease with acute exacerbation (H)       12/7/2024   [facility] EMERGENCY DEPT       Marshall Lord MD  12/07/24 8414

## 2024-12-07 NOTE — ED TRIAGE NOTES
Pt has had cough and SOA for about 9 days.      Triage Assessment (Adult)       Row Name 12/07/24 1145          Triage Assessment    Airway WDL WDL        Respiratory WDL    Respiratory WDL WDL        Peripheral/Neurovascular WDL    Peripheral Neurovascular WDL WDL

## 2024-12-07 NOTE — DISCHARGE INSTRUCTIONS
Take doxycycline 100 mg twice daily for 7 days.  Take prednisone 20 mg, 2 pills in the morning with food for 5 days.  Continue your long-acting inhaler.  Use your short acting albuterol inhaler 2 puffs up to every 4 hours if needed for wheezing or shortness of breath.  Be seen if you are not improved in 48 hours or have any new or worsening symptoms.  In 2 weeks consider getting influenza and RSV vaccinations to prevent getting these infections which will be very severe given your underlying COPD.  Keep working on quitting smoking.  For help quitting smoking, you can go here:   Http://www.[email or web address]  Or here:  Http://www.quitplan.com